# Patient Record
Sex: MALE | Race: WHITE
[De-identification: names, ages, dates, MRNs, and addresses within clinical notes are randomized per-mention and may not be internally consistent; named-entity substitution may affect disease eponyms.]

---

## 2021-04-02 ENCOUNTER — HOSPITAL ENCOUNTER (OUTPATIENT)
Dept: HOSPITAL 79 - RAD | Age: 31
End: 2021-04-02
Attending: INTERNAL MEDICINE
Payer: COMMERCIAL

## 2021-04-02 DIAGNOSIS — M54.5: Primary | ICD-10-CM

## 2021-06-05 ENCOUNTER — HOSPITAL ENCOUNTER (EMERGENCY)
Dept: HOSPITAL 79 - ER1 | Age: 31
Discharge: LEFT BEFORE DISCHARGE | End: 2021-06-05
Payer: COMMERCIAL

## 2021-06-05 DIAGNOSIS — Z53.21: Primary | ICD-10-CM

## 2021-07-23 ENCOUNTER — DOCUMENTATION (OUTPATIENT)
Dept: BARIATRICS/WEIGHT MGMT | Facility: CLINIC | Age: 31
End: 2021-07-23

## 2021-07-23 ENCOUNTER — OFFICE VISIT (OUTPATIENT)
Dept: BARIATRICS/WEIGHT MGMT | Facility: CLINIC | Age: 31
End: 2021-07-23

## 2021-07-23 VITALS
HEART RATE: 74 BPM | SYSTOLIC BLOOD PRESSURE: 136 MMHG | BODY MASS INDEX: 40.43 KG/M2 | DIASTOLIC BLOOD PRESSURE: 76 MMHG | WEIGHT: 298.5 LBS | OXYGEN SATURATION: 98 % | HEIGHT: 72 IN | RESPIRATION RATE: 18 BRPM | TEMPERATURE: 96.9 F

## 2021-07-23 DIAGNOSIS — I10 HYPERTENSION, UNSPECIFIED TYPE: ICD-10-CM

## 2021-07-23 DIAGNOSIS — K21.9 GASTROESOPHAGEAL REFLUX DISEASE, UNSPECIFIED WHETHER ESOPHAGITIS PRESENT: ICD-10-CM

## 2021-07-23 DIAGNOSIS — E78.1 HYPERTRIGLYCERIDEMIA: ICD-10-CM

## 2021-07-23 DIAGNOSIS — F41.9 ANXIETY: ICD-10-CM

## 2021-07-23 DIAGNOSIS — Z72.0 CURRENT EVERY DAY NICOTINE VAPING: ICD-10-CM

## 2021-07-23 DIAGNOSIS — R10.13 DYSPEPSIA: ICD-10-CM

## 2021-07-23 DIAGNOSIS — E78.5 HYPERLIPIDEMIA, UNSPECIFIED HYPERLIPIDEMIA TYPE: ICD-10-CM

## 2021-07-23 DIAGNOSIS — R06.00 DYSPNEA, UNSPECIFIED TYPE: ICD-10-CM

## 2021-07-23 DIAGNOSIS — K85.90 ACUTE PANCREATITIS, UNSPECIFIED COMPLICATION STATUS, UNSPECIFIED PANCREATITIS TYPE: ICD-10-CM

## 2021-07-23 DIAGNOSIS — R01.1 MURMUR: Primary | ICD-10-CM

## 2021-07-23 DIAGNOSIS — M10.9 GOUT, UNSPECIFIED CAUSE, UNSPECIFIED CHRONICITY, UNSPECIFIED SITE: ICD-10-CM

## 2021-07-23 DIAGNOSIS — E11.69 TYPE 2 DIABETES MELLITUS WITH OTHER SPECIFIED COMPLICATION, WITHOUT LONG-TERM CURRENT USE OF INSULIN (HCC): ICD-10-CM

## 2021-07-23 PROCEDURE — 99204 OFFICE O/P NEW MOD 45 MIN: CPT | Performed by: PHYSICIAN ASSISTANT

## 2021-07-23 RX ORDER — ERGOCALCIFEROL 1.25 MG/1
CAPSULE ORAL
COMMUNITY
Start: 2021-05-06

## 2021-07-23 RX ORDER — GLIMEPIRIDE 4 MG/1
4 TABLET ORAL DAILY
COMMUNITY
Start: 2021-05-06

## 2021-07-23 RX ORDER — ATORVASTATIN CALCIUM 40 MG/1
40 TABLET, FILM COATED ORAL
COMMUNITY
Start: 2021-05-06

## 2021-07-23 RX ORDER — LISINOPRIL AND HYDROCHLOROTHIAZIDE 25; 20 MG/1; MG/1
1 TABLET ORAL DAILY
COMMUNITY
Start: 2021-05-06

## 2021-07-23 RX ORDER — EZETIMIBE 10 MG/1
10 TABLET ORAL DAILY
COMMUNITY
Start: 2021-05-06

## 2021-07-23 RX ORDER — OMEPRAZOLE 40 MG/1
40 CAPSULE, DELAYED RELEASE ORAL DAILY
COMMUNITY
Start: 2021-05-06

## 2021-07-23 NOTE — PROGRESS NOTES
"Metabolic and Bariatric Surgery  Presurgical Nutrition Assessment     [facility]  07/23/2021  [id number]  [id number]  1990  male    Surgery desired: Sleeve Gastrectomy     Ht 182.9 cm (72\")   Wt 135 kg (298 lb 8 oz)      Current Outpatient Medications:   •  atorvastatin (LIPITOR) 40 MG tablet, Take 40 mg by mouth every night at bedtime., Disp: , Rfl:   •  ezetimibe (ZETIA) 10 MG tablet, Take 10 mg by mouth Daily., Disp: , Rfl:   •  glimepiride (AMARYL) 4 MG tablet, Take 4 mg by mouth Daily., Disp: , Rfl:   •  lisinopril-hydrochlorothiazide (PRINZIDE,ZESTORETIC) 20-25 MG per tablet, Take 1 tablet by mouth Daily. for blood pressure., Disp: , Rfl:   •  metFORMIN (GLUCOPHAGE) 1000 MG tablet, Take 1,000 mg by mouth 2 (two) times a day., Disp: , Rfl:   •  omeprazole (priLOSEC) 40 MG capsule, Take 40 mg by mouth Daily., Disp: , Rfl:   •  vitamin D (ERGOCALCIFEROL) 1.25 MG (94855 UT) capsule capsule, TAKE ONE CAPSULE BY MOUTH ONCE EVERY WEEK FOR VITAMIN D DEFICIENCY, Disp: , Rfl:       Nutrition Assessment    Estimated energy needs: 2800    Estimated calories for weight loss: 1800    IBW (Pounds): 198     Excess body weight (Pounds): 100       Nutrition Recall  24 Hour recall: (B) (L) (D) -  Reviewed and discussed with patient  Lunch:  [place] with lunch meat and cucumber  Dinner:  Bala sandwich, tater tots  Late meal:  Steak, vegetables, garlic bread, fried okra, baked beans     Exercise  active at work      Education    Provided manual for Sleeve Gastrectomy and reviewed necessary vitamin and protein supplementation for surgery.     Provided follow-up options for support, including contact information for dietitians here, if desired.  Web-based support information and apps for smart phones and computers given.  Noted that support group is offered at this clinic, and that support is associated with weight loss.    Recommend that team follow per protocol.      Nutrition Goals   Dietary Guidelines per " manual  Protein goal:  grams per day     Exercise Goals  Continue current exercise routine   Add 15-30 minutes of activity per day as tolerated        Angie Benitez RD  07/23/2021  10:59 EDT

## 2021-07-23 NOTE — PROGRESS NOTES
[facility] BARIATRIC SURGERY  [address]  [address]  formerly [facility] [id number] [phone]      Patient  Name:  Cezar Burden  :  1990        Chief Complaint:  weight gain; unable to maintain weight loss    History of Present Illness:  Cezar Burden is a 31 y.o. male who presents today for evaluation, education and consultation regarding bariatric and metabolic surgery. The patient is interested in sleeve gastrectomy with Dr. King.     Cezar has been overweight for all his life.Previous diet attempts include: High Protein, Low Carbohydrate and Fasting; None; None.  The most weight Cezar lost was 20 pounds on keto but was only able to maintain that weight loss for a few months.  His maximum lifetime weight is 353 pounds.    As above, patient has been overweight for many years, with numerous failed dietary/weight loss attempts.  He now has obesity related comorbidities and as such has decided to pursue weight loss surgery.    His wife had LSG with Dr. King recently, A1C went form 11.5 to 7 in 3 months, he has seen her success and ready for health improvement in himself.     H/o HTN, HLD/ hypertriglyceridemia, childhood murmur, DM dx . Gout- last flare approx , no issues since. Anxiety.  vaper- ready to quit.    GI: Reflux- daily omeprazole controls symptoms well, no past EGD. Episode of pancreatitis hospitalized, thought to be r/t hypertriglyceridemia, no procedures needed. GB present, no other GI complaints.     All other past medical, surgical, social and family history have been obtained and discussed as pertinent to bariatric surgery as below.     Past Medical History:   Diagnosis Date   • Acid reflux     daily omeprazole controls symptoms well, no past EGD.    • Anxiety    • Current every day nicotine vaping    • Diabetes (CMS/[facility])    • Fatigue    • Gout     last flare approx , no issues since   • HLD (hyperlipidemia)    • HTN (hypertension)    •  Hypertriglyceridemia    • Morbid obesity with BMI of 40.0-44.9, adult (CMS/[facility])    • Murmur     childhood   • Pancreatitis 2020    hypertriglyceridemia, no procedures.      History reviewed. No pertinent surgical history.    No Known Allergies    Current Outpatient Medications:   •  atorvastatin (LIPITOR) 40 MG tablet, Take 40 mg by mouth every night at bedtime., Disp: , Rfl:   •  ezetimibe (ZETIA) 10 MG tablet, Take 10 mg by mouth Daily., Disp: , Rfl:   •  glimepiride (AMARYL) 4 MG tablet, Take 4 mg by mouth Daily., Disp: , Rfl:   •  lisinopril-hydrochlorothiazide (PRINZIDE,ZESTORETIC) 20-25 MG per tablet, Take 1 tablet by mouth Daily. for blood pressure., Disp: , Rfl:   •  metFORMIN (GLUCOPHAGE) 1000 MG tablet, Take 1,000 mg by mouth 2 (two) times a day., Disp: , Rfl:   •  omeprazole (priLOSEC) 40 MG capsule, Take 40 mg by mouth Daily., Disp: , Rfl:   •  vitamin D (ERGOCALCIFEROL) 1.25 MG (81115 UT) capsule capsule, TAKE ONE CAPSULE BY MOUTH ONCE EVERY WEEK FOR VITAMIN D DEFICIENCY, Disp: , Rfl:     Social History     Socioeconomic History   • Marital status:      Spouse name: Not on file   • Number of children: Not on file   • Years of education: Not on file   • Highest education level: Not on file   Tobacco Use   • Smoking status: Former Smoker   • Smokeless tobacco: Never Used   Vaping Use   • Vaping Use: Every day   Substance and Sexual Activity   • Alcohol use: Yes     Comment: occasionally   • Drug use: Never     Family History   Problem Relation Age of Onset   • Obesity Mother    • Diabetes Mother    • Hypertension Mother    • Sleep apnea Mother    • Obesity Father    • Hypertension Father    • Diabetes Maternal Grandmother    • Hypertension Maternal Grandmother    • Cancer Maternal Grandmother         skin       Review of Systems:  Constitutional:  Reports fatigue, weight gain and denies fevers, chills.  HEENT:  denies headache, ear pain or loss of hearing, blurred or double vision, nasal discharge  or sore throat.  Cardiovascular:  Reports HTN, HLD, heart murmur and denies CAD, Atrial Fib, hx heart disease, heart murmur, hx MI, chest pain, palpitations, edema, hx DVT.  Respiratory:  Reports none and denies dyspnea on exertion, shortness of breath , cough , wheezing, sleep apnea, asthma, hx PE.  Gastrointestinal:  Reports heartburn and denies dysphagia, nausea, vomiting, abdominal pain, IBS, diarrhea, constipation, melena, blood in stool, gallbladder issues, liver disease, hx pancreatitis.  Genitourinary:  Reports none and denies history of  frequent UTI, incontinence, hematuria, dysuria, polyuria, polydipsia, renal insufficiency, renal failure.    Musculoskeletal:  Reports none and denies joint pain, fibromyalgia, arthritis and autoimmune disease.  Neurological:  Reports numbness /tingling and denies headaches, migraines, dizziness, confusion, seizure, stroke.  Psychiatric:  Reports hx anxiety and denies depressed mood, hx depression, bipolar disorder, suicidal ideation, hx suicide attempt, hx self injury, hx substance abuse, eating disorder.  Endocrine:  Reports diabetes, gout and denies thyroid disease.  Hematologic:  Reports none and denies bruising, bleeding disorder, hx anemia, hx blood transfusion.  Skin:  Reports none and denies rashes, hx MRSA.      Physical Exam:  Vital Signs:  Weight: 135 kg (298 lb 8 oz)   Body mass index is 40.48 kg/m².  Temp: 96.9 °F (36.1 °C)   Heart Rate: 74   BP: 136/76     Physical Exam  Vitals reviewed.   Constitutional:       Appearance: He is well-developed. He is obese.   HENT:      Head: Normocephalic.      Comments: mask  Neck:      Thyroid: No thyromegaly.   Cardiovascular:      Rate and Rhythm: Normal rate and regular rhythm.      Heart sounds: Normal heart sounds.   Pulmonary:      Effort: Pulmonary effort is normal. No respiratory distress.      Breath sounds: Normal breath sounds. No wheezing.   Abdominal:      General: Bowel sounds are normal. There is no  distension.      Palpations: Abdomen is soft.      Tenderness: There is no abdominal tenderness.   Musculoskeletal:         General: Normal range of motion.      Cervical back: Normal range of motion and neck supple.   Skin:     General: Skin is warm and dry.      Comments: Scattered brawny hyperpigmentation of LE   Neurological:      Mental Status: He is alert and oriented to person, place, and time.   Psychiatric:         Mood and Affect: Mood normal.         Behavior: Behavior normal.         Thought Content: Thought content normal.         Judgment: Judgment normal.         Patient Active Problem List   Diagnosis   • Murmur   • HTN (hypertension)   • HLD (hyperlipidemia)   • Diabetes (CMS/HCC)   • Acid reflux   • Hypertriglyceridemia   • Anxiety   • Current every day nicotine vaping   • Morbid obesity with BMI of 40.0-44.9, adult (CMS/HCC)   • Pancreatitis   • Gout   • Fatigue       Assessment:    Cezar Burden is a 31 y.o. year old male with medically complicated obesity pursuing sleeve gastrectomy.    Weight loss surgery is deemed medically necessary given the following obesity related comorbidities including diabetes, hypertension, dyslipidemia and GERD with current Weight: 135 kg (298 lb 8 oz) and Body mass index is 40.48 kg/m²..    Plan:  The consultation plan and program requirements were reviewed with the patient.  The patient has been advised that a letter of medical support must be obtained from his primary care physician or referring provider. A psychological evaluation will be arranged.  A nutritional evaluation will be performed.  The patient was advised to start a high protein and low carbohydrate diet.  Necessary lifestyle modifications were discussed.  Instructions on how to access DAYDAY was given to the patient.  DAYDAY is an internet based educational video that explains the surgical procedure chosen and answers basic questions regarding that procedure.     Patient's Body mass index is 40.48 kg/m².  indicating that he is morbidly obese (BMI > 40 or > 35 with obesity - related health condition). Obesity-related health conditions include the following: hypertension, diabetes mellitus, dyslipidemias and GERD. Obesity is worsening. BMI is is above average; BMI management plan is completed. We discussed consulting a Bariatric surgeon..        Preoperative testing will include: CBC, CMP, Lipids, TSH, HgA1C, EKG, CXR, Pulmonary Function Testing, Nicotine Testing, Gastric Emptying Study and EGD, h pylori    Additional preop clearances required prior to surgery: Cardiology.      The patient has been educated on expected postoperative lifestyle changes, including commitment to high protein diet, vitamin regimen, and exercise program.  They are aware that support groups are encouraged for optimal weight loss results. Patient understands that bariatric surgery is not cosmetic surgery but rather a tool to help make a lifelong commitment to lifestyle changes including diet, exercise, behavior modifications, and healthy habits. The procedure was discussed with the patient and all questions were answered. The importance of avoiding ASA/ NSAIDS/ steroids/ tobacco/ hormones/ immunomodulators perioperatively was discussed.         Asmita Mejias PA-C

## 2021-07-26 LAB
ALBUMIN SERPL-MCNC: 4.4 G/DL (ref 4–5)
ALBUMIN/GLOB SERPL: 1.6 {RATIO} (ref 1.2–2.2)
ALP SERPL-CCNC: 66 IU/L (ref 48–121)
ALT SERPL-CCNC: 29 IU/L (ref 0–44)
AST SERPL-CCNC: 14 IU/L (ref 0–40)
BASOPHILS # BLD AUTO: 0.1 X10E3/UL (ref 0–0.2)
BASOPHILS NFR BLD AUTO: 2 %
BILIRUB SERPL-MCNC: 0.8 MG/DL (ref 0–1.2)
BUN SERPL-MCNC: 11 MG/DL (ref 6–20)
BUN/CREAT SERPL: 13 (ref 9–20)
CALCIUM SERPL-MCNC: 9.9 MG/DL (ref 8.7–10.2)
CHLORIDE SERPL-SCNC: 98 MMOL/L (ref 96–106)
CHOLEST SERPL-MCNC: 143 MG/DL (ref 100–199)
CO2 SERPL-SCNC: 26 MMOL/L (ref 20–29)
CREAT SERPL-MCNC: 0.87 MG/DL (ref 0.76–1.27)
EOSINOPHIL # BLD AUTO: 0.1 X10E3/UL (ref 0–0.4)
EOSINOPHIL NFR BLD AUTO: 2 %
ERYTHROCYTE [DISTWIDTH] IN BLOOD BY AUTOMATED COUNT: 12.9 % (ref 11.6–15.4)
GLOBULIN SER CALC-MCNC: 2.8 G/DL (ref 1.5–4.5)
GLUCOSE SERPL-MCNC: 305 MG/DL (ref 65–99)
H PYLORI IGA SER-ACNC: <9 UNITS (ref 0–8.9)
H PYLORI IGG SER IA-ACNC: 0.16 INDEX VALUE (ref 0–0.79)
H PYLORI IGM SER-ACNC: <9 UNITS (ref 0–8.9)
HBA1C MFR BLD: 10.2 % (ref 4.8–5.6)
HCT VFR BLD AUTO: 48.4 % (ref 37.5–51)
HDLC SERPL-MCNC: 36 MG/DL
HGB BLD-MCNC: 16.8 G/DL (ref 13–17.7)
IMM GRANULOCYTES # BLD AUTO: 0 X10E3/UL (ref 0–0.1)
IMM GRANULOCYTES NFR BLD AUTO: 0 %
LDLC SERPL CALC-MCNC: 46 MG/DL (ref 0–99)
LYMPHOCYTES # BLD AUTO: 2.7 X10E3/UL (ref 0.7–3.1)
LYMPHOCYTES NFR BLD AUTO: 44 %
MCH RBC QN AUTO: 29 PG (ref 26.6–33)
MCHC RBC AUTO-ENTMCNC: 34.7 G/DL (ref 31.5–35.7)
MCV RBC AUTO: 83 FL (ref 79–97)
MONOCYTES # BLD AUTO: 0.4 X10E3/UL (ref 0.1–0.9)
MONOCYTES NFR BLD AUTO: 6 %
NEUTROPHILS # BLD AUTO: 2.8 X10E3/UL (ref 1.4–7)
NEUTROPHILS NFR BLD AUTO: 46 %
PLATELET # BLD AUTO: 262 X10E3/UL (ref 150–450)
POTASSIUM SERPL-SCNC: 5.2 MMOL/L (ref 3.5–5.2)
PROT SERPL-MCNC: 7.2 G/DL (ref 6–8.5)
RBC # BLD AUTO: 5.8 X10E6/UL (ref 4.14–5.8)
SODIUM SERPL-SCNC: 137 MMOL/L (ref 134–144)
TRIGL SERPL-MCNC: 413 MG/DL (ref 0–149)
TSH SERPL DL<=0.005 MIU/L-ACNC: 1.84 UIU/ML (ref 0.45–4.5)
VLDLC SERPL CALC-MCNC: 61 MG/DL (ref 5–40)
WBC # BLD AUTO: 6.1 X10E3/UL (ref 3.4–10.8)

## 2021-07-30 ENCOUNTER — APPOINTMENT (OUTPATIENT)
Dept: PREADMISSION TESTING | Facility: HOSPITAL | Age: 31
End: 2021-07-30

## 2021-07-30 PROCEDURE — U0004 COV-19 TEST NON-CDC HGH THRU: HCPCS

## 2021-07-30 PROCEDURE — C9803 HOPD COVID-19 SPEC COLLECT: HCPCS

## 2021-07-31 LAB — SARS-COV-2 RNA PNL SPEC NAA+PROBE: NOT DETECTED

## 2021-08-02 ENCOUNTER — LAB REQUISITION (OUTPATIENT)
Dept: LAB | Facility: HOSPITAL | Age: 31
End: 2021-08-02

## 2021-08-02 ENCOUNTER — OUTSIDE FACILITY SERVICE (OUTPATIENT)
Dept: BARIATRICS/WEIGHT MGMT | Facility: CLINIC | Age: 31
End: 2021-08-02

## 2021-08-02 DIAGNOSIS — K25.9 GASTRIC ULCER, UNSPECIFIED AS ACUTE OR CHRONIC, WITHOUT HEMORRHAGE OR PERFORATION: ICD-10-CM

## 2021-08-02 PROCEDURE — 88305 TISSUE EXAM BY PATHOLOGIST: CPT | Performed by: SURGERY

## 2021-08-02 PROCEDURE — 43239 EGD BIOPSY SINGLE/MULTIPLE: CPT | Performed by: SURGERY

## 2021-08-03 DIAGNOSIS — K21.9 GASTROESOPHAGEAL REFLUX DISEASE, UNSPECIFIED WHETHER ESOPHAGITIS PRESENT: ICD-10-CM

## 2021-08-03 LAB
CYTO UR: NORMAL
LAB AP CASE REPORT: NORMAL
LAB AP CLINICAL INFORMATION: NORMAL
PATH REPORT.FINAL DX SPEC: NORMAL
PATH REPORT.GROSS SPEC: NORMAL

## 2021-08-10 ENCOUNTER — OFFICE VISIT (OUTPATIENT)
Dept: CARDIOLOGY | Facility: CLINIC | Age: 31
End: 2021-08-10

## 2021-08-10 VITALS
SYSTOLIC BLOOD PRESSURE: 140 MMHG | HEART RATE: 77 BPM | TEMPERATURE: 97.3 F | HEIGHT: 72 IN | BODY MASS INDEX: 40.28 KG/M2 | DIASTOLIC BLOOD PRESSURE: 86 MMHG | OXYGEN SATURATION: 94 % | WEIGHT: 297.4 LBS

## 2021-08-10 DIAGNOSIS — I10 HYPERTENSION, UNSPECIFIED TYPE: ICD-10-CM

## 2021-08-10 DIAGNOSIS — E66.01 MORBID OBESITY WITH BMI OF 40.0-44.9, ADULT (HCC): ICD-10-CM

## 2021-08-10 DIAGNOSIS — E11.69 TYPE 2 DIABETES MELLITUS WITH OTHER SPECIFIED COMPLICATION, WITHOUT LONG-TERM CURRENT USE OF INSULIN (HCC): ICD-10-CM

## 2021-08-10 DIAGNOSIS — Z01.810 PREOPERATIVE CARDIOVASCULAR EXAMINATION: Primary | ICD-10-CM

## 2021-08-10 DIAGNOSIS — Z86.79 HISTORY OF CARDIAC MURMUR: ICD-10-CM

## 2021-08-10 DIAGNOSIS — R06.09 DYSPNEA ON EXERTION: ICD-10-CM

## 2021-08-10 DIAGNOSIS — E78.1 HYPERTRIGLYCERIDEMIA: ICD-10-CM

## 2021-08-10 PROCEDURE — 99204 OFFICE O/P NEW MOD 45 MIN: CPT | Performed by: NURSE PRACTITIONER

## 2021-08-10 PROCEDURE — 93000 ELECTROCARDIOGRAM COMPLETE: CPT | Performed by: NURSE PRACTITIONER

## 2021-08-10 NOTE — PROGRESS NOTES
Subjective   Cezar Burden is a 31 y.o. male.   Chief Complaint   Patient presents with   • Establish Care   • Surgical Clearance     BIATRIAC SURGERY      History of Present Illness   Buster burden is a 31-year-old  male who presents to the clinic today as a new patient for cardiac evaluation.    He is currently undergoing work-up for gastric surgery.   Hypertension currently on lisinopril/HCTZ daily.  Reports home monitoring with normal readings.  Denies chest pain.  Does try to limit sodium however reports it is very difficult.  Hyper triglyceridemia currently being treated by his PCP.  He reports hospitalized about a year ago for pancreatitis as a result of elevated triglycerides.  He reports at that time his triglycerides were over 3000.  He currently is taking Lipitor daily and Zetia 10 mg daily without myalgias.  He is trying to eat healthier and exercise.  He feels his labs have improved significantly.  He reports a history of childhood murmur but no sequela has adult.  He does not recall any recent cardiac work-up or echocardiogram.  He is a diabetic and currently being treated by his PCP.  He does currently use an E-Cig and is working on cutting back/quitting.  Denies family history of cardiovascular disease.  Denies palpitations, swelling in lower extremities, dizziness, syncope or lightheadedness.    The following portions of the patient's history were reviewed and updated as appropriate: allergies, current medications, past family history, past medical history, past social history, past surgical history and problem list.    Current Outpatient Medications:   •  atorvastatin (LIPITOR) 40 MG tablet, Take 40 mg by mouth every night at bedtime., Disp: , Rfl:   •  ezetimibe (ZETIA) 10 MG tablet, Take 10 mg by mouth Daily., Disp: , Rfl:   •  glimepiride (AMARYL) 4 MG tablet, Take 4 mg by mouth Daily., Disp: , Rfl:   •  lisinopril-hydrochlorothiazide (PRINZIDE,ZESTORETIC) 20-25 MG per tablet, Take 1  "tablet by mouth Daily. for blood pressure., Disp: , Rfl:   •  metFORMIN (GLUCOPHAGE) 1000 MG tablet, Take 1,000 mg by mouth 2 (two) times a day., Disp: , Rfl:   •  omeprazole (priLOSEC) 40 MG capsule, Take 40 mg by mouth Daily., Disp: , Rfl:   •  vitamin D (ERGOCALCIFEROL) 1.25 MG (35189 UT) capsule capsule, TAKE ONE CAPSULE BY MOUTH ONCE EVERY WEEK FOR VITAMIN D DEFICIENCY, Disp: , Rfl:     Review of Systems   Constitutional: Negative for activity change, appetite change, chills, fatigue and fever.   HENT: Negative for congestion, drooling, ear discharge, ear pain, mouth sores, nosebleeds, postnasal drip, rhinorrhea, sneezing and sore throat.    Eyes: Negative for pain, discharge and visual disturbance.   Respiratory: Negative for cough, shortness of breath and wheezing.    Cardiovascular: Negative for chest pain, palpitations and leg swelling.   Gastrointestinal: Negative for abdominal pain, constipation, diarrhea, nausea and vomiting.   Skin: Negative for rash and wound.   Neurological: Negative for dizziness, syncope, weakness, numbness and headaches.   All other systems reviewed and are negative.    /86   Pulse 77   Temp 97.3 °F (36.3 °C)   Ht 182.9 cm (72\")   Wt 135 kg (297 lb 6.4 oz)   SpO2 94%   BMI 40.33 kg/m²     Objective   No Known Allergies    Physical Exam  Vitals reviewed.   Constitutional:       Appearance: Normal appearance. He is well-developed.   HENT:      Head: Normocephalic.   Eyes:      Conjunctiva/sclera: Conjunctivae normal.      Pupils: Pupils are equal, round, and reactive to light.   Neck:      Thyroid: No thyromegaly.      Vascular: No carotid bruit or JVD.   Cardiovascular:      Rate and Rhythm: Normal rate and regular rhythm.   Pulmonary:      Effort: Pulmonary effort is normal.      Breath sounds: Normal breath sounds.   Abdominal:      General: Bowel sounds are normal.      Palpations: Abdomen is soft. There is no hepatomegaly, splenomegaly or mass.      Tenderness: There " is no abdominal tenderness.   Musculoskeletal:         General: Normal range of motion.      Cervical back: Normal range of motion and neck supple.      Right lower leg: No edema.      Left lower leg: No edema.   Skin:     General: Skin is warm and dry.      Comments: Scarring noted on upper and lower extremities from wounds    Neurological:      Mental Status: He is alert and oriented to person, place, and time.   Psychiatric:         Attention and Perception: Attention normal.         Mood and Affect: Mood normal.         Speech: Speech normal.         Behavior: Behavior normal. Behavior is cooperative.         Cognition and Memory: Cognition normal.         ECG 12 Lead    Date/Time: 8/10/2021 3:06 PM  Performed by: Irma Marcelino APRN  Authorized by: Irma Marcelino APRN   Comparison: not compared with previous ECG   Rhythm: sinus rhythm  Rate: normal  BPM: 74    Clinical impression: non-specific ECG            Assessment/Plan   Diagnoses and all orders for this visit:    1. Preoperative cardiovascular examination (Primary)  -     Treadmill Stress Test; Future  EKG, reviewed and discussed    2. Dyspnea on exertion  -     Adult Transthoracic Echo Complete W/ Cont if Necessary Per Protocol; Future  -     ECG 12 Lead    3. Hypertension, unspecified type  Mildly elevated today, will continue to monitor BP and heart rate, encouraging compliance with medication, counseling on low-sodium dietary intake.  If persistently greater than 140/90 will need medication titration.    4. Hypertriglyceridemia  On Lipitor and Zetia followed by PCP.  Review of laboratory testing from 6/20/2021.  Healthy lifestyle encouraged and low cholesterol dietary intake    5. Type 2 diabetes mellitus with other specified complication, without long-term current use of insulin (CMS/HCC)  Followed and managed by PCP    6. Morbid obesity with BMI of 40.0-44.9, adult (CMS/HCC)  Loss discussed healthy lifestyle encouraged    7. History of  cardiac murmur  -     Adult Transthoracic Echo Complete W/ Cont if Necessary Per Protocol; Future    Proceed with cardiovascular work-up, results pending.  Encourage in monitoring of BP and reviewed and discussed the importance of persistent readings less than 140/90 before surgical clearance.  He expresses understanding. Follow-up in 4 weeks, sooner if needed.

## 2021-08-18 ENCOUNTER — APPOINTMENT (OUTPATIENT)
Dept: RESPIRATORY THERAPY | Facility: HOSPITAL | Age: 31
End: 2021-08-18

## 2021-08-18 ENCOUNTER — HOSPITAL ENCOUNTER (OUTPATIENT)
Dept: NUCLEAR MEDICINE | Facility: HOSPITAL | Age: 31
Discharge: HOME | End: 2021-08-18

## 2021-08-18 DIAGNOSIS — R10.13 DYSPEPSIA: ICD-10-CM

## 2021-08-18 DIAGNOSIS — E11.69 TYPE 2 DIABETES MELLITUS WITH OTHER SPECIFIED COMPLICATION, WITHOUT LONG-TERM CURRENT USE OF INSULIN (HCC): ICD-10-CM

## 2021-08-18 DIAGNOSIS — K21.9 GASTROESOPHAGEAL REFLUX DISEASE, UNSPECIFIED WHETHER ESOPHAGITIS PRESENT: ICD-10-CM

## 2021-08-18 PROCEDURE — 78264 GASTRIC EMPTYING IMG STUDY: CPT

## 2021-08-18 PROCEDURE — 78264 GASTRIC EMPTYING IMG STUDY: CPT | Performed by: RADIOLOGY

## 2021-08-18 PROCEDURE — 0 TECHNETIUM SULFUR COLLOID: Performed by: PHYSICIAN ASSISTANT

## 2021-08-18 PROCEDURE — A9541 TC99M SULFUR COLLOID: HCPCS | Performed by: PHYSICIAN ASSISTANT

## 2021-08-18 RX ADMIN — TECHNETIUM TC 99M SULFUR COLLOID 1 DOSE: KIT at 13:30

## 2021-08-19 ENCOUNTER — TRANSCRIBE ORDERS (OUTPATIENT)
Dept: ADMINISTRATIVE | Facility: HOSPITAL | Age: 31
End: 2021-08-19

## 2021-08-19 DIAGNOSIS — Z01.818 OTHER SPECIFIED PRE-OPERATIVE EXAMINATION: Primary | ICD-10-CM

## 2021-08-20 ENCOUNTER — LAB (OUTPATIENT)
Dept: LAB | Facility: HOSPITAL | Age: 31
End: 2021-08-20

## 2021-08-20 ENCOUNTER — APPOINTMENT (OUTPATIENT)
Dept: RESPIRATORY THERAPY | Facility: HOSPITAL | Age: 31
End: 2021-08-20

## 2021-08-20 DIAGNOSIS — Z01.818 OTHER SPECIFIED PRE-OPERATIVE EXAMINATION: ICD-10-CM

## 2021-08-25 ENCOUNTER — TELEMEDICINE (OUTPATIENT)
Dept: PSYCHIATRY | Facility: CLINIC | Age: 31
End: 2021-08-25

## 2021-08-25 DIAGNOSIS — Z71.89 PRE-BARIATRIC SURGERY PSYCHOLOGICAL EVALUATION: Primary | ICD-10-CM

## 2021-08-25 PROCEDURE — 90792 PSYCH DIAG EVAL W/MED SRVCS: CPT | Performed by: NURSE PRACTITIONER
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
## 2021-08-27 ENCOUNTER — HOSPITAL ENCOUNTER (OUTPATIENT)
Dept: CARDIOLOGY | Facility: HOSPITAL | Age: 31
Discharge: HOME | End: 2021-08-27

## 2021-08-27 DIAGNOSIS — R06.09 DYSPNEA ON EXERTION: ICD-10-CM

## 2021-08-27 DIAGNOSIS — Z86.79 HISTORY OF CARDIAC MURMUR: ICD-10-CM

## 2021-08-27 DIAGNOSIS — Z01.810 PREOPERATIVE CARDIOVASCULAR EXAMINATION: ICD-10-CM

## 2021-08-27 PROCEDURE — 93306 TTE W/DOPPLER COMPLETE: CPT

## 2021-08-27 PROCEDURE — 93306 TTE W/DOPPLER COMPLETE: CPT | Performed by: INTERNAL MEDICINE

## 2021-08-27 PROCEDURE — 93017 CV STRESS TEST TRACING ONLY: CPT

## 2021-08-27 PROCEDURE — 93018 CV STRESS TEST I&R ONLY: CPT | Performed by: INTERNAL MEDICINE

## 2021-08-30 DIAGNOSIS — R93.1 ABNORMAL ECHOCARDIOGRAM: Primary | ICD-10-CM

## 2021-08-30 LAB
BH CV ECHO MEAS - ACS: 2.4 CM
BH CV ECHO MEAS - AO ROOT AREA (BSA CORRECTED): 1.2
BH CV ECHO MEAS - AO ROOT AREA: 7.8 CM^2
BH CV ECHO MEAS - AO ROOT DIAM: 3.2 CM
BH CV ECHO MEAS - BSA(HAYCOCK): 2.7 M^2
BH CV ECHO MEAS - BSA: 2.5 M^2
BH CV ECHO MEAS - BZI_BMI: 40.3 KILOGRAMS/M^2
BH CV ECHO MEAS - BZI_METRIC_HEIGHT: 182.9 CM
BH CV ECHO MEAS - BZI_METRIC_WEIGHT: 134.7 KG
BH CV ECHO MEAS - EDV(CUBED): 74.1 ML
BH CV ECHO MEAS - EDV(MOD-SP4): 82.5 ML
BH CV ECHO MEAS - EDV(TEICH): 78.6 ML
BH CV ECHO MEAS - EF(CUBED): 46.9 %
BH CV ECHO MEAS - EF(MOD-SP4): 37.7 %
BH CV ECHO MEAS - EF(TEICH): 39.6 %
BH CV ECHO MEAS - ESV(CUBED): 39.3 ML
BH CV ECHO MEAS - ESV(MOD-SP4): 51.4 ML
BH CV ECHO MEAS - ESV(TEICH): 47.4 ML
BH CV ECHO MEAS - FS: 19 %
BH CV ECHO MEAS - IVS/LVPW: 1.1
BH CV ECHO MEAS - IVSD: 1.6 CM
BH CV ECHO MEAS - LA DIMENSION: 3.6 CM
BH CV ECHO MEAS - LA/AO: 1.1
BH CV ECHO MEAS - LV DIASTOLIC VOL/BSA (35-75): 32.7 ML/M^2
BH CV ECHO MEAS - LV MASS(C)D: 249.5 GRAMS
BH CV ECHO MEAS - LV MASS(C)DI: 99 GRAMS/M^2
BH CV ECHO MEAS - LV SYSTOLIC VOL/BSA (12-30): 20.4 ML/M^2
BH CV ECHO MEAS - LVIDD: 4.2 CM
BH CV ECHO MEAS - LVIDS: 3.4 CM
BH CV ECHO MEAS - LVLD AP4: 8.5 CM
BH CV ECHO MEAS - LVLS AP4: 8.6 CM
BH CV ECHO MEAS - LVOT AREA (M): 3.1 CM^2
BH CV ECHO MEAS - LVOT AREA: 3.1 CM^2
BH CV ECHO MEAS - LVOT DIAM: 2 CM
BH CV ECHO MEAS - LVPWD: 1.4 CM
BH CV ECHO MEAS - MV A MAX VEL: 66.8 CM/SEC
BH CV ECHO MEAS - MV E MAX VEL: 99 CM/SEC
BH CV ECHO MEAS - MV E/A: 1.5
BH CV ECHO MEAS - PA ACC TIME: 0.12 SEC
BH CV ECHO MEAS - PA PR(ACCEL): 25 MMHG
BH CV ECHO MEAS - SI(CUBED): 13.8 ML/M^2
BH CV ECHO MEAS - SI(MOD-SP4): 12.3 ML/M^2
BH CV ECHO MEAS - SI(TEICH): 12.4 ML/M^2
BH CV ECHO MEAS - SV(CUBED): 34.8 ML
BH CV ECHO MEAS - SV(MOD-SP4): 31.1 ML
BH CV ECHO MEAS - SV(TEICH): 31.1 ML
BH CV STRESS BP STAGE 1: NORMAL
BH CV STRESS BP STAGE 2: NORMAL
BH CV STRESS DURATION MIN STAGE 1: 3
BH CV STRESS DURATION MIN STAGE 2: 3
BH CV STRESS DURATION MIN STAGE 3: 2
BH CV STRESS DURATION SEC STAGE 1: 0
BH CV STRESS DURATION SEC STAGE 2: 0
BH CV STRESS DURATION SEC STAGE 3: 51
BH CV STRESS GRADE STAGE 1: 10
BH CV STRESS GRADE STAGE 2: 12
BH CV STRESS GRADE STAGE 3: 14
BH CV STRESS HR STAGE 1: 112
BH CV STRESS HR STAGE 2: 128
BH CV STRESS HR STAGE 3: 163
BH CV STRESS METS STAGE 1: 5
BH CV STRESS METS STAGE 2: 7.5
BH CV STRESS METS STAGE 3: 10
BH CV STRESS PROTOCOL 1: NORMAL
BH CV STRESS RECOVERY BP: NORMAL MMHG
BH CV STRESS RECOVERY HR: 104 BPM
BH CV STRESS SPEED STAGE 1: 1.7
BH CV STRESS SPEED STAGE 2: 2.5
BH CV STRESS SPEED STAGE 3: 3.4
BH CV STRESS STAGE 1: 1
BH CV STRESS STAGE 2: 2
BH CV STRESS STAGE 3: 3
MAXIMAL PREDICTED HEART RATE: 189 BPM
MAXIMAL PREDICTED HEART RATE: 189 BPM
PERCENT MAX PREDICTED HR: 86.24 %
STRESS BASELINE BP: NORMAL MMHG
STRESS BASELINE HR: 85 BPM
STRESS PERCENT HR: 101 %
STRESS POST ESTIMATED WORKLOAD: 10.1 METS
STRESS POST EXERCISE DUR MIN: 8 MIN
STRESS POST EXERCISE DUR SEC: 51 SEC
STRESS POST PEAK BP: NORMAL MMHG
STRESS POST PEAK HR: 163 BPM
STRESS TARGET HR: 161 BPM
STRESS TARGET HR: 161 BPM

## 2021-09-01 ENCOUNTER — APPOINTMENT (OUTPATIENT)
Dept: RESPIRATORY THERAPY | Facility: HOSPITAL | Age: 31
End: 2021-09-01

## 2021-09-17 ENCOUNTER — APPOINTMENT (OUTPATIENT)
Dept: RESPIRATORY THERAPY | Facility: HOSPITAL | Age: 31
End: 2021-09-17

## 2021-09-20 ENCOUNTER — APPOINTMENT (OUTPATIENT)
Dept: NUCLEAR MEDICINE | Facility: HOSPITAL | Age: 31
End: 2021-09-20

## 2021-09-20 ENCOUNTER — APPOINTMENT (OUTPATIENT)
Dept: CARDIOLOGY | Facility: HOSPITAL | Age: 31
End: 2021-09-20

## 2021-09-22 ENCOUNTER — HOSPITAL ENCOUNTER (INPATIENT)
Dept: HOSPITAL 79 - ER1 | Age: 31
LOS: 2 days | Discharge: HOME | DRG: 177 | End: 2021-09-24
Attending: INTERNAL MEDICINE | Admitting: INTERNAL MEDICINE
Payer: COMMERCIAL

## 2021-09-22 VITALS — HEIGHT: 74 IN | BODY MASS INDEX: 38.5 KG/M2 | WEIGHT: 300 LBS

## 2021-09-22 DIAGNOSIS — I10: ICD-10-CM

## 2021-09-22 DIAGNOSIS — Z82.49: ICD-10-CM

## 2021-09-22 DIAGNOSIS — D69.6: ICD-10-CM

## 2021-09-22 DIAGNOSIS — J15.9: ICD-10-CM

## 2021-09-22 DIAGNOSIS — U07.1: Primary | ICD-10-CM

## 2021-09-22 DIAGNOSIS — J12.82: ICD-10-CM

## 2021-09-22 DIAGNOSIS — E78.5: ICD-10-CM

## 2021-09-22 DIAGNOSIS — J96.01: ICD-10-CM

## 2021-09-22 DIAGNOSIS — F17.210: ICD-10-CM

## 2021-09-22 DIAGNOSIS — E66.01: ICD-10-CM

## 2021-09-22 LAB
BUN/CREATININE RATIO: 15 (ref 0–10)
HGB BLD-MCNC: 16.5 GM/DL (ref 14–17.5)
RED BLOOD COUNT: 5.57 M/UL (ref 4.2–5.5)
WHITE BLOOD COUNT: 6.3 K/UL (ref 4.5–11)

## 2021-09-22 PROCEDURE — 3E0333Z INTRODUCTION OF ANTI-INFLAMMATORY INTO PERIPHERAL VEIN, PERCUTANEOUS APPROACH: ICD-10-PCS | Performed by: INTERNAL MEDICINE

## 2021-09-22 PROCEDURE — U0002 COVID-19 LAB TEST NON-CDC: HCPCS

## 2021-09-22 PROCEDURE — M0243 CASIRIVI AND IMDEVI INFUSION: HCPCS

## 2021-09-22 PROCEDURE — 8E0ZXY6 ISOLATION: ICD-10-PCS | Performed by: INTERNAL MEDICINE

## 2021-09-22 PROCEDURE — XW033G6 INTRODUCTION OF REGN-COV2 MONOCLONAL ANTIBODY INTO PERIPHERAL VEIN, PERCUTANEOUS APPROACH, NEW TECHNOLOGY GROUP 6: ICD-10-PCS | Performed by: INTERNAL MEDICINE

## 2021-09-22 PROCEDURE — XW033E5 INTRODUCTION OF REMDESIVIR ANTI-INFECTIVE INTO PERIPHERAL VEIN, PERCUTANEOUS APPROACH, NEW TECHNOLOGY GROUP 5: ICD-10-PCS | Performed by: INTERNAL MEDICINE

## 2021-09-23 LAB
BUN/CREATININE RATIO: 24 (ref 0–10)
HGB BLD-MCNC: 16.6 GM/DL (ref 14–17.5)
RED BLOOD COUNT: 5.54 M/UL (ref 4.2–5.5)
WHITE BLOOD COUNT: 6.4 K/UL (ref 4.5–11)

## 2021-09-24 LAB
BUN/CREATININE RATIO: 30 (ref 0–10)
HGB BLD-MCNC: 14.7 GM/DL (ref 14–17.5)
RED BLOOD COUNT: 4.97 M/UL (ref 4.2–5.5)
WHITE BLOOD COUNT: 6.8 K/UL (ref 4.5–11)

## 2022-04-09 ENCOUNTER — HOSPITAL ENCOUNTER (EMERGENCY)
Dept: HOSPITAL 79 - ER1 | Age: 32
Discharge: HOME | End: 2022-04-09
Payer: COMMERCIAL
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
## 2022-04-09 DIAGNOSIS — I10: ICD-10-CM

## 2022-04-09 DIAGNOSIS — Z79.84: ICD-10-CM

## 2022-04-09 DIAGNOSIS — E11.9: ICD-10-CM

## 2022-04-09 DIAGNOSIS — S29.012A: Primary | ICD-10-CM
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
## 2023-04-21 ENCOUNTER — OFFICE VISIT (OUTPATIENT)
Dept: CARDIOLOGY | Facility: CLINIC | Age: 33
End: 2023-04-21
Payer: MEDICAID

## 2023-04-21 VITALS
BODY MASS INDEX: 38.5 KG/M2 | OXYGEN SATURATION: 97 % | RESPIRATION RATE: 20 BRPM | DIASTOLIC BLOOD PRESSURE: 72 MMHG | HEART RATE: 81 BPM | WEIGHT: 300 LBS | SYSTOLIC BLOOD PRESSURE: 108 MMHG | HEIGHT: 74 IN

## 2023-04-21 DIAGNOSIS — E11.69 TYPE 2 DIABETES MELLITUS WITH OTHER SPECIFIED COMPLICATION, WITHOUT LONG-TERM CURRENT USE OF INSULIN: ICD-10-CM

## 2023-04-21 DIAGNOSIS — I51.9 LV DYSFUNCTION: ICD-10-CM

## 2023-04-21 DIAGNOSIS — E66.01 MORBID OBESITY WITH BMI OF 40.0-44.9, ADULT: ICD-10-CM

## 2023-04-21 DIAGNOSIS — Z91.89 MULTIPLE RISK FACTORS FOR CORONARY ARTERY DISEASE: ICD-10-CM

## 2023-04-21 DIAGNOSIS — Z91.89 RISK FACTORS FOR OBSTRUCTIVE SLEEP APNEA: ICD-10-CM

## 2023-04-21 DIAGNOSIS — E78.2 MIXED HYPERLIPIDEMIA: ICD-10-CM

## 2023-04-21 DIAGNOSIS — R93.1 ABNORMAL ECHOCARDIOGRAM: Primary | ICD-10-CM

## 2023-04-21 DIAGNOSIS — I10 PRIMARY HYPERTENSION: ICD-10-CM

## 2023-04-21 RX ORDER — FENOFIBRATE 145 MG/1
145 TABLET, COATED ORAL DAILY
Qty: 30 TABLET | Refills: 3 | Status: SHIPPED | OUTPATIENT
Start: 2023-04-21

## 2023-04-21 NOTE — PROGRESS NOTES
Subjective     Cezar Burden is a 33 y.o. male.   Chief Complaint   Patient presents with   • Heart Problem     New pt here states by pcp for abnormal ekg left ventricle abnormal      History of Present Illness   Cezar Burden is a 33-year-old male who presents to clinic today for cardiology follow-up.  He was seen a year and a half ago for preoperative surgical evaluation and was advised to follow-up however never followed up.  He returns today to follow-up.    He reports he had been off all medications until recently he has been recently initiated on medications.  He reports he has been taken medicines for about 2 weeks on a regular basis. He has underlying history of hypertension currently on lisinopril HCTZ 20/25, no home monitoring.  Hyperlipidemia currently on Lipitor 40 mg nightly.  His most recent labs from 4/13/2023 from PCP show and uncalculated LDL due to triglycerides at 530.  Patient reports that is much better for him than 3000.  He has had problems with pancreatitis as a result of elevated triglycerides previously.  He states he has recently made some lifestyle changes trying to improve his dietary intake.    Previous testing with mild LV dysfunction at 46 to 50%.  He had undergone a treadmill stress test and had nuclear stress test was recommended with follow-up to change medications and discuss results however he was unable to follow-up.  He complains of intermittent dyspnea denies swelling denies orthopnea occasional palpitation however not frequently no chest pain.  He does have fatigue and daytime somnolence, no history of sleep apnea and has not been previously tested.  He is a former smoker and does vape currently. History significant for DM, currently on oral medications, managed by PCP.  Most recent hemoglobin A1c was 9.9.    Patient Active Problem List   Diagnosis   • Murmur   • HTN (hypertension)   • HLD (hyperlipidemia)   • Diabetes   • Acid reflux   • Hypertriglyceridemia   • Anxiety   •  Current every day nicotine vaping   • Morbid obesity with BMI of 40.0-44.9, adult   • Pancreatitis   • Gout   • Fatigue     Past Medical History:   Diagnosis Date   • Acid reflux     daily omeprazole controls symptoms well, no past EGD.    • Anxiety    • Current every day nicotine vaping    • Diabetes 2014   • Fatigue    • Gout     last flare approx 2016, no issues since   • HLD (hyperlipidemia)    • HTN (hypertension)    • Hypertriglyceridemia    • Morbid obesity with BMI of 40.0-44.9, adult    • Murmur     childhood   • Pancreatitis 2020    hypertriglyceridemia, no procedures.      Past Surgical History:   Procedure Laterality Date   • COLONOSCOPY     • ENDOSCOPY       Family History   Problem Relation Age of Onset   • Obesity Mother    • Diabetes Mother    • Hypertension Mother    • Sleep apnea Mother    • Bipolar disorder Mother    • Obesity Father    • Hypertension Father    • Diabetes Maternal Grandmother    • Hypertension Maternal Grandmother    • Cancer Maternal Grandmother         skin     Social History     Tobacco Use   • Smoking status: Former   • Smokeless tobacco: Never   • Tobacco comments:     vapes   Vaping Use   • Vaping Use: Every day   • Substances: Nicotine, Flavoring   • Devices: Pre-filled or refillable cartridge, Refillable tank   Substance Use Topics   • Alcohol use: Not Currently   • Drug use: Never     The following portions of the patient's history were reviewed and updated as appropriate: allergies, current medications, past family history, past medical history, past social history, past surgical history and problem list.    No Known Allergies      Current Outpatient Medications:   •  atorvastatin (LIPITOR) 40 MG tablet, Take 1 tablet by mouth every night at bedtime., Disp: , Rfl:   •  glimepiride (AMARYL) 4 MG tablet, Take 1 tablet by mouth Daily., Disp: , Rfl:   •  lisinopril-hydrochlorothiazide (PRINZIDE,ZESTORETIC) 20-25 MG per tablet, Take 1 tablet by mouth Daily. for blood  "pressure., Disp: , Rfl:   •  metFORMIN (GLUCOPHAGE) 1000 MG tablet, Take 1 tablet by mouth 2 (two) times a day., Disp: , Rfl:   •  fenofibrate (TRICOR) 145 MG tablet, Take 1 tablet by mouth Daily., Disp: 30 tablet, Rfl: 3    Review of Systems   Constitutional: Positive for fatigue. Negative for activity change, appetite change, chills, diaphoresis and fever.   HENT: Negative for congestion, drooling, ear discharge, ear pain, mouth sores, nosebleeds, postnasal drip, rhinorrhea, sinus pressure, sneezing and sore throat.    Eyes: Negative for pain, discharge and visual disturbance.   Respiratory: Positive for shortness of breath. Negative for cough, chest tightness and wheezing.    Cardiovascular: Negative for chest pain, palpitations and leg swelling.   Gastrointestinal: Negative for abdominal pain, constipation, diarrhea, nausea and vomiting.   Endocrine: Negative for cold intolerance, heat intolerance, polydipsia, polyphagia and polyuria.   Musculoskeletal: Negative for arthralgias, myalgias and neck pain.   Skin: Negative for rash and wound.   Neurological: Negative for dizziness, syncope, speech difficulty, weakness, light-headedness and headaches.   Hematological: Negative for adenopathy. Does not bruise/bleed easily.   Psychiatric/Behavioral: Negative for confusion, dysphoric mood and sleep disturbance. The patient is not nervous/anxious.    All other systems reviewed and are negative.    /72 (BP Location: Left arm, Patient Position: Sitting, Cuff Size: Adult)   Pulse 81   Resp 20   Ht 188 cm (74\")   Wt 136 kg (300 lb)   SpO2 97%   BMI 38.52 kg/m²     Objective   No Known Allergies    Physical Exam  Vitals reviewed.   Constitutional:       Appearance: Normal appearance. He is well-developed. He is obese.   HENT:      Head: Normocephalic.   Eyes:      Conjunctiva/sclera: Conjunctivae normal.   Neck:      Vascular: No carotid bruit or JVD.   Cardiovascular:      Rate and Rhythm: Normal rate and regular " rhythm.   Pulmonary:      Effort: Pulmonary effort is normal.      Breath sounds: Normal breath sounds.   Musculoskeletal:      Cervical back: Neck supple.      Right lower leg: No edema.      Left lower leg: No edema.   Skin:     General: Skin is warm and dry.   Neurological:      Mental Status: He is alert and oriented to person, place, and time.   Psychiatric:         Attention and Perception: Attention normal.         Mood and Affect: Mood normal.         Speech: Speech normal.         Behavior: Behavior normal. Behavior is cooperative.         Cognition and Memory: Cognition normal.           ECG 12 Lead    Date/Time: 4/21/2023 10:29 AM  Performed by: Irma Marcelino APRN  Authorized by: Irma Marcelino APRN   Comparison: compared with previous ECG   Similar to previous ECG  Rhythm: sinus rhythm  Rate: normal  BPM: 81  Other findings: non-specific ST-T wave changes  Other findings comments: possible right ventricular hyptertrophy    Clinical impression: abnormal EKG  Comments: QT/QTc - 355/392                LABS  WBC   Date Value Ref Range Status   07/23/2021 6.1 3.4 - 10.8 x10E3/uL Final     RBC   Date Value Ref Range Status   07/23/2021 5.80 4.14 - 5.80 x10E6/uL Final     Hemoglobin   Date Value Ref Range Status   07/23/2021 16.8 13.0 - 17.7 g/dL Final     Hematocrit   Date Value Ref Range Status   07/23/2021 48.4 37.5 - 51.0 % Final     MCV   Date Value Ref Range Status   07/23/2021 83 79 - 97 fL Final     MCH   Date Value Ref Range Status   07/23/2021 29.0 26.6 - 33.0 pg Final     MCHC   Date Value Ref Range Status   07/23/2021 34.7 31.5 - 35.7 g/dL Final     RDW   Date Value Ref Range Status   07/23/2021 12.9 11.6 - 15.4 % Final     Platelets   Date Value Ref Range Status   07/23/2021 262 150 - 450 x10E3/uL Final     Neutrophil Rel %   Date Value Ref Range Status   07/23/2021 46 Not Estab. % Final     Lymphocyte Rel %   Date Value Ref Range Status   07/23/2021 44 Not Estab. % Final     Monocyte Rel  %   Date Value Ref Range Status   07/23/2021 6 Not Estab. % Final     Eosinophil Rel %   Date Value Ref Range Status   07/23/2021 2 Not Estab. % Final     Basophil Rel %   Date Value Ref Range Status   07/23/2021 2 Not Estab. % Final     Neutrophils Absolute   Date Value Ref Range Status   07/23/2021 2.8 1.4 - 7.0 x10E3/uL Final     Lymphocytes Absolute   Date Value Ref Range Status   07/23/2021 2.7 0.7 - 3.1 x10E3/uL Final     Monocytes Absolute   Date Value Ref Range Status   07/23/2021 0.4 0.1 - 0.9 x10E3/uL Final     Eosinophils Absolute   Date Value Ref Range Status   07/23/2021 0.1 0.0 - 0.4 x10E3/uL Final     Basophils Absolute   Date Value Ref Range Status   07/23/2021 0.1 0.0 - 0.2 x10E3/uL Final         Triglycerides   Date Value Ref Range Status   07/23/2021 413 (H) 0 - 149 mg/dL Final     HDL Cholesterol   Date Value Ref Range Status   07/23/2021 36 (L) >39 mg/dL Final     LDL Chol Calc (NIH)   Date Value Ref Range Status   07/23/2021 46 0 - 99 mg/dL Final     IMAGING  US abdomen complete    Result Date: 4/19/2023  Fatty infiltration of the liver. Prominent spleen. Films reviewed, interpreted, and dictated by Dr. Choi. Transcribed by Tricia Jack PA-C.    FL esoph swallow funct with cine video    Result Date: 4/13/2023  Normal.  No restrictions required based on the result of this examination.  No increased risk for aspiration with any of the trials.   Images reviewed, interpreted, and dictated by Rosalind Bell MD    ECHOCARDIOGRAM 8/27/2021  Interpretation Summary    • And poor quality echo and Doppler study was obtained.  • Left ventricle cavity size is normal with mild concentric left ventricular hypertrophy.  • Left ventricle is hypokinetic with LV ejection fraction around 46 to 50%  • Left ventricular diastolic function is consistent with (grade I) impaired relaxation.  • Aortic valve appears to be normal with no evidence of aortic stenosis or aortic regurgitation.  • Mitral valve not  very well visualized but appears to be normal with no evidence of aortic stenosis or aortic regurgitation.  • Tricuspid and pulmonic valve echoes are not very well visualized but no significant abnormality by Doppler noted.  • No pericardial effusion detected.      Treadmill Stress Test 8/27/2021  Interpretation Summary    • A stress test was performed following the Lv protocol.  • Resting EKG showed sinus rhythm rate of 70/min, right superior axis and poor R wave progression across the anterior chest leads noted. Old anterior wall myocardial infarction cannot be excluded.  • Patient walked 8:51 on treadmill reaching target heart rate, patient denied any chest discomfort  • ST segments did not show any diagnostic changes. No significant arrhythmia detected.  • Heart rate and blood pressure response was appropriate.  • Stress test is felt to be negative for significant exercise-induced myocardial ischemia.           Assessment & Plan   Diagnoses and all orders for this visit:    1. Abnormal echocardiogram (Primary)  Discussed and reviewed echocardiogram  Plan to adjust medications once testing has been completed and labs have been performed.    2. LV dysfunction  -     Basic Metabolic Panel; Future  -     BNP; Future  -     Adult Transthoracic Echo Complete W/ Cont if Necessary Per Protocol; Future  -     Stress Test With Myocardial Perfusion (1 Day); Future  Ischemic evaluation will be arranged    3. Primary hypertension  -     ECG 12 Lead  Controlled, continue to monitor, sodium restrictions recommended    4. Mixed hyperlipidemia  Continue statin, discussed and reviewed recent labs with elevated triglycerides, will plan to start fenofibrate and continue to monitor. Heart healthy diet    5. Type 2 diabetes mellitus with other specified complication, without long-term current use of insulin  Recommend tight glycemic control for overall health benefit    6. Multiple risk factors for coronary artery disease  Further  evaluation will be arranged    7. Morbid obesity with BMI of 40.0-44.9, adult  Lifestyle modifications including heart healthy diet, regular exercise, maintenance of desirable body weight and avoidance of tobacco products.    Other orders  -     fenofibrate (TRICOR) 145 MG tablet; Take 1 tablet by mouth Daily.  Dispense: 30 tablet; Refill: 3      Patient will need to undergo sleep apnea evaluation    Follow-up in 4 weeks to assess and review testing, sooner if needed

## 2023-04-21 NOTE — LETTER
May 11, 2023     Tracie Rizvi MD  [address]  [address] [address]    Patient: Cezar Burden   YOB: 1990   Date of Visit: 4/21/2023       Dear Tracie Rizvi MD,    Cezar Burden was in my office today. Below are the relevant portions of my assessment and plan of care.           If you have questions, please do not hesitate to call me. I look forward to following Cezar along with you.         Sincerely,        DALE Bates        CC: No Recipients

## 2023-05-04 ENCOUNTER — TELEPHONE (OUTPATIENT)
Dept: CARDIOLOGY | Facility: CLINIC | Age: 33
End: 2023-05-04
Payer: MEDICAID

## 2023-05-04 NOTE — TELEPHONE ENCOUNTER
Called pt and informed him of the following per Irma Marcelino APRN   His echocardiogram in 2021 showed his LVEF at 46-50%, which is mildly lower than normal. We are awaiting further testing results. When he performed his treadmill stress test in 2021 he was able to walk on the treadmill without significant problems. Exercise is generally good for the heart muscle and can make it stronger. I will leave it up to them if he goes to the gym or not. If he does he needs to take it slow and gradual. If he has any symptoms he needs to stop and not push himself further. I would not recommend any type of prework mixes/ protein supplements currently.   Irma

## 2023-05-04 NOTE — TELEPHONE ENCOUNTER
Patients wife called wants to know if it is safe to go to the gym and work out and if there are exercises that should be limited, they just got a gym membership but with his ejection fraction they want to know what is safe to do. Please call her at:  [place]  [phone]

## 2023-05-10 ENCOUNTER — HOSPITAL ENCOUNTER (OUTPATIENT)
Dept: CARDIOLOGY | Facility: HOSPITAL | Age: 33
Discharge: HOME | End: 2023-05-10
Admitting: NURSE PRACTITIONER
Payer: MEDICAID

## 2023-05-10 DIAGNOSIS — I51.9 LV DYSFUNCTION: ICD-10-CM

## 2023-05-10 LAB
BH CV ECHO MEAS - AO MAX PG: 7.8 MMHG
BH CV ECHO MEAS - AO MEAN PG: 5 MMHG
BH CV ECHO MEAS - AO ROOT DIAM: 2.8 CM
BH CV ECHO MEAS - AO V2 MAX: 140 CM/SEC
BH CV ECHO MEAS - AO V2 VTI: 28.1 CM
BH CV ECHO MEAS - EDV(CUBED): 59.3 ML
BH CV ECHO MEAS - EDV(MOD-SP4): 75.7 ML
BH CV ECHO MEAS - EF(MOD-SP4): 53.6 %
BH CV ECHO MEAS - ESV(CUBED): 50.7 ML
BH CV ECHO MEAS - ESV(MOD-SP4): 35.1 ML
BH CV ECHO MEAS - FS: 5.1 %
BH CV ECHO MEAS - IVS/LVPW: 1 CM
BH CV ECHO MEAS - IVSD: 1.4 CM
BH CV ECHO MEAS - LA DIMENSION: 3.3 CM
BH CV ECHO MEAS - LAT PEAK E' VEL: 10.9 CM/SEC
BH CV ECHO MEAS - LV DIASTOLIC VOL/BSA (35-75): 29.3 CM2
BH CV ECHO MEAS - LV MASS(C)D: 201.5 GRAMS
BH CV ECHO MEAS - LV SYSTOLIC VOL/BSA (12-30): 13.6 CM2
BH CV ECHO MEAS - LVIDD: 3.9 CM
BH CV ECHO MEAS - LVIDS: 3.7 CM
BH CV ECHO MEAS - LVOT AREA: 2.5 CM2
BH CV ECHO MEAS - LVOT DIAM: 1.8 CM
BH CV ECHO MEAS - LVPWD: 1.4 CM
BH CV ECHO MEAS - MED PEAK E' VEL: 11 CM/SEC
BH CV ECHO MEAS - MV A MAX VEL: 82.8 CM/SEC
BH CV ECHO MEAS - MV E MAX VEL: 102 CM/SEC
BH CV ECHO MEAS - MV E/A: 1.23
BH CV ECHO MEAS - PA ACC TIME: 0.1 SEC
BH CV ECHO MEAS - PA PR(ACCEL): 36.3 MMHG
BH CV ECHO MEAS - SI(MOD-SP4): 15.7 ML/M2
BH CV ECHO MEAS - SV(MOD-SP4): 40.6 ML
BH CV ECHO MEAS - TAPSE (>1.6): 1.89 CM
BH CV ECHO MEASUREMENTS AVERAGE E/E' RATIO: 9.32
LEFT ATRIUM VOLUME INDEX: 17.9 ML/M2
MAXIMAL PREDICTED HEART RATE: 187 BPM
STRESS TARGET HR: 159 BPM

## 2023-05-10 PROCEDURE — 93306 TTE W/DOPPLER COMPLETE: CPT

## 2023-05-10 PROCEDURE — 93306 TTE W/DOPPLER COMPLETE: CPT | Performed by: INTERNAL MEDICINE

## 2023-05-15 ENCOUNTER — TELEPHONE (OUTPATIENT)
Dept: CARDIOLOGY | Facility: CLINIC | Age: 33
End: 2023-05-15
Payer: MEDICAID

## 2023-05-15 NOTE — TELEPHONE ENCOUNTER
----- Message from DALE Short sent at 5/15/2023  3:54 PM EDT -----  Echocardiogram with LVEF of 51-55 % which is low normal. Will discuss further at follow up visit.

## 2023-05-15 NOTE — TELEPHONE ENCOUNTER
Called pt and informed him of the following per Irma Marcelino APRN  Echocardiogram with LVEF of 51-55 % which is low normal. Will discuss further at follow up visit.

## 2023-06-02 ENCOUNTER — HOSPITAL ENCOUNTER (OUTPATIENT)
Dept: CARDIOLOGY | Facility: HOSPITAL | Age: 33
Discharge: HOME | End: 2023-06-02

## 2023-06-02 ENCOUNTER — HOSPITAL ENCOUNTER (OUTPATIENT)
Dept: NUCLEAR MEDICINE | Facility: HOSPITAL | Age: 33
Discharge: HOME | End: 2023-06-02

## 2023-06-02 DIAGNOSIS — I51.9 LV DYSFUNCTION: ICD-10-CM

## 2023-06-02 LAB
BH CV NUCLEAR PRIOR STUDY: 3
BH CV REST NUCLEAR ISOTOPE DOSE: 9.5 MCI
BH CV STRESS BP STAGE 1: NORMAL
BH CV STRESS BP STAGE 2: NORMAL
BH CV STRESS COMMENTS STAGE 1: NORMAL
BH CV STRESS DOSE REGADENOSON STAGE 1: 0.4
BH CV STRESS DURATION MIN STAGE 1: 3
BH CV STRESS DURATION MIN STAGE 2: 3
BH CV STRESS DURATION SEC STAGE 1: 0
BH CV STRESS DURATION SEC STAGE 2: 0
BH CV STRESS DURATION SEC STAGE 3: 58
BH CV STRESS GRADE STAGE 1: 10
BH CV STRESS GRADE STAGE 2: 12
BH CV STRESS GRADE STAGE 3: 14
BH CV STRESS HR STAGE 1: 117
BH CV STRESS HR STAGE 2: 139
BH CV STRESS HR STAGE 3: 157
BH CV STRESS METS STAGE 1: 5
BH CV STRESS METS STAGE 2: 7.5
BH CV STRESS METS STAGE 3: 10
BH CV STRESS NUCLEAR ISOTOPE DOSE: 28.1 MCI
BH CV STRESS PROTOCOL 1: NORMAL
BH CV STRESS RECOVERY BP: NORMAL MMHG
BH CV STRESS RECOVERY HR: 99 BPM
BH CV STRESS SPEED STAGE 1: 1.7
BH CV STRESS SPEED STAGE 2: 2.5
BH CV STRESS SPEED STAGE 3: 3.4
BH CV STRESS STAGE 1: 1
BH CV STRESS STAGE 2: 2
BH CV STRESS STAGE 3: 3
LV EF NUC BP: 61 %
MAXIMAL PREDICTED HEART RATE: 187 BPM
PERCENT MAX PREDICTED HR: 83.96 %
STRESS BASELINE BP: NORMAL MMHG
STRESS BASELINE HR: 82 BPM
STRESS PERCENT HR: 99 %
STRESS POST ESTIMATED WORKLOAD: 9.9 METS
STRESS POST EXERCISE DUR MIN: 6 MIN
STRESS POST EXERCISE DUR SEC: 58 SEC
STRESS POST PEAK BP: NORMAL MMHG
STRESS POST PEAK HR: 157 BPM
STRESS TARGET HR: 159 BPM

## 2023-06-02 PROCEDURE — 0 TECHNETIUM SESTAMIBI: Performed by: NURSE PRACTITIONER

## 2023-06-02 PROCEDURE — A9500 TC99M SESTAMIBI: HCPCS | Performed by: NURSE PRACTITIONER

## 2023-06-02 PROCEDURE — 93017 CV STRESS TEST TRACING ONLY: CPT

## 2023-06-02 PROCEDURE — 78452 HT MUSCLE IMAGE SPECT MULT: CPT

## 2023-06-02 RX ADMIN — TECHNETIUM TC 99M SESTAMIBI 1 DOSE: 1 INJECTION INTRAVENOUS at 09:45

## 2023-06-02 RX ADMIN — TECHNETIUM TC 99M SESTAMIBI 1 DOSE: 1 INJECTION INTRAVENOUS at 11:07

## 2023-07-25 ENCOUNTER — OFFICE VISIT (OUTPATIENT)
Dept: CARDIOLOGY | Facility: CLINIC | Age: 33
End: 2023-07-25
Payer: MEDICAID

## 2023-07-25 VITALS
OXYGEN SATURATION: 96 % | SYSTOLIC BLOOD PRESSURE: 152 MMHG | WEIGHT: 313 LBS | HEART RATE: 86 BPM | DIASTOLIC BLOOD PRESSURE: 92 MMHG | BODY MASS INDEX: 40.17 KG/M2 | HEIGHT: 74 IN

## 2023-07-25 DIAGNOSIS — I51.9 LV DYSFUNCTION: Primary | ICD-10-CM

## 2023-07-25 DIAGNOSIS — E78.2 MIXED HYPERLIPIDEMIA: ICD-10-CM

## 2023-07-25 DIAGNOSIS — E66.01 MORBID OBESITY WITH BMI OF 40.0-44.9, ADULT: ICD-10-CM

## 2023-07-25 DIAGNOSIS — E11.69 TYPE 2 DIABETES MELLITUS WITH OTHER SPECIFIED COMPLICATION, WITHOUT LONG-TERM CURRENT USE OF INSULIN: ICD-10-CM

## 2023-07-25 DIAGNOSIS — I10 HYPERTENSION, UNSPECIFIED TYPE: ICD-10-CM

## 2023-07-25 PROCEDURE — 3077F SYST BP >= 140 MM HG: CPT | Performed by: NURSE PRACTITIONER

## 2023-07-25 PROCEDURE — 1159F MED LIST DOCD IN RCRD: CPT | Performed by: NURSE PRACTITIONER

## 2023-07-25 PROCEDURE — 3080F DIAST BP >= 90 MM HG: CPT | Performed by: NURSE PRACTITIONER

## 2023-07-25 PROCEDURE — 1160F RVW MEDS BY RX/DR IN RCRD: CPT | Performed by: NURSE PRACTITIONER

## 2023-07-25 PROCEDURE — 99214 OFFICE O/P EST MOD 30 MIN: CPT | Performed by: NURSE PRACTITIONER

## 2023-07-25 RX ORDER — PANTOPRAZOLE SODIUM 40 MG/1
40 TABLET, DELAYED RELEASE ORAL DAILY
COMMUNITY

## 2023-07-25 RX ORDER — FENOFIBRATE 145 MG/1
145 TABLET, COATED ORAL DAILY
Qty: 90 TABLET | Refills: 1 | Status: SHIPPED | OUTPATIENT
Start: 2023-07-25

## 2023-07-25 RX ORDER — CARVEDILOL 3.12 MG/1
3.12 TABLET ORAL 2 TIMES DAILY
Qty: 60 TABLET | Refills: 2 | Status: SHIPPED | OUTPATIENT
Start: 2023-07-25

## 2023-07-25 NOTE — PROGRESS NOTES
Subjective     Cezar Burden is a 33 y.o. male.   Chief Complaint   Patient presents with    Results     Labs    Hypertension     Today     History of Present Illness   Cezar burden is a 33-year-old male who presents to clinic today for cardiology follow-up.    LV dysfunction 46 to 50% from echocardiogram 8/30/2021.  He recently completed an echocardiogram with hypokinesis noted of the septum and borderline low LVEF at 51 to 55%.  He denies worsening dyspnea, orthopnea, chest pains, swelling or palpitations.    Hypertension currently on lisinopril - HCTZ 20/25, no home monitoring. He has tried to restrict sodium intake. Hyperlipidemia currently on Lipitor 40 mg nightly and Tricor 145 mg daily. Most recent labs from July 23 with triglycerides at 305 and LDL at goal at 11. He has had problems with pancreatitis as a result of elevated triglycerides previously.  He states he has recently made some lifestyle changes trying to improve his dietary intake and overall health.      He is a former smoker and does vape currently. History significant for DM, currently on oral medications, managed by PCP.  Most recent hemoglobin A1c was 9.1.    Patient Active Problem List   Diagnosis    Murmur    HTN (hypertension)    HLD (hyperlipidemia)    Diabetes    Acid reflux    Hypertriglyceridemia    Anxiety    Current every day nicotine vaping    Morbid obesity with BMI of 40.0-44.9, adult    Pancreatitis    Gout    Fatigue     Past Medical History:   Diagnosis Date    Acid reflux     daily omeprazole controls symptoms well, no past EGD.     Anxiety     Current every day nicotine vaping     Diabetes 2014    Fatigue     Gout     last flare approx 2016, no issues since    HLD (hyperlipidemia)     HTN (hypertension)     Hypertriglyceridemia     Morbid obesity with BMI of 40.0-44.9, adult     Murmur     childhood    Pancreatitis 2020    hypertriglyceridemia, no procedures.      Past Surgical History:   Procedure Laterality Date    COLONOSCOPY       ENDOSCOPY       Family History   Problem Relation Age of Onset    Obesity Mother     Diabetes Mother     Hypertension Mother     Sleep apnea Mother     Bipolar disorder Mother     Obesity Father     Hypertension Father     Diabetes Maternal Grandmother     Hypertension Maternal Grandmother     Cancer Maternal Grandmother         skin     Social History     Tobacco Use    Smoking status: Former    Smokeless tobacco: Never    Tobacco comments:     vapes   Vaping Use    Vaping Use: Every day    Substances: Nicotine, Flavoring    Devices: Pre-filled or refillable cartridge, Refillable tank   Substance Use Topics    Alcohol use: Not Currently    Drug use: Never     The following portions of the patient's history were reviewed and updated as appropriate: allergies, current medications, past family history, past medical history, past social history, past surgical history and problem list.    No Known Allergies      Current Outpatient Medications:     atorvastatin (LIPITOR) 40 MG tablet, Take 1 tablet by mouth every night at bedtime., Disp: , Rfl:     fenofibrate (TRICOR) 145 MG tablet, Take 1 tablet by mouth Daily., Disp: 90 tablet, Rfl: 1    glimepiride (AMARYL) 4 MG tablet, Take 1 tablet by mouth Daily., Disp: , Rfl:     lisinopril-hydrochlorothiazide (PRINZIDE,ZESTORETIC) 20-25 MG per tablet, Take 1 tablet by mouth Daily. for blood pressure., Disp: , Rfl:     metFORMIN (GLUCOPHAGE) 1000 MG tablet, Take 1 tablet by mouth 2 (two) times a day., Disp: , Rfl:     pantoprazole (PROTONIX) 40 MG EC tablet, Take 1 tablet by mouth Daily., Disp: , Rfl:     carvedilol (COREG) 3.125 MG tablet, Take 1 tablet by mouth 2 (Two) Times a Day., Disp: 60 tablet, Rfl: 2    Review of Systems   Constitutional:  Negative for activity change, appetite change, chills, diaphoresis, fatigue and fever.   HENT:  Negative for congestion, drooling, ear discharge, ear pain, mouth sores, nosebleeds, postnasal drip, rhinorrhea, sinus pressure,  "sneezing and sore throat.    Eyes:  Negative for pain, discharge and visual disturbance.   Respiratory:  Negative for cough, chest tightness, shortness of breath and wheezing.    Cardiovascular:  Negative for chest pain, palpitations and leg swelling.   Gastrointestinal:  Negative for abdominal pain, constipation, diarrhea, nausea and vomiting.   Endocrine: Negative for cold intolerance, heat intolerance, polydipsia, polyphagia and polyuria.   Musculoskeletal:  Negative for arthralgias, myalgias and neck pain.   Skin:  Negative for rash and wound.   Neurological:  Negative for dizziness, syncope, speech difficulty, weakness, light-headedness and headaches.   Hematological:  Negative for adenopathy. Does not bruise/bleed easily.   Psychiatric/Behavioral:  Negative for confusion, dysphoric mood and sleep disturbance. The patient is not nervous/anxious.    All other systems reviewed and are negative.    /92 (BP Location: Right arm, Patient Position: Sitting, Cuff Size: Large Adult)   Pulse 86   Ht 188 cm (74\")   Wt (!) 142 kg (313 lb)   SpO2 96%   BMI 40.19 kg/mý   BP recheck 142/86    Objective   No Known Allergies    Physical Exam  Vitals reviewed.   Constitutional:       Appearance: Normal appearance. He is well-developed. He is obese.   HENT:      Head: Normocephalic.   Eyes:      Conjunctiva/sclera: Conjunctivae normal.   Neck:      Thyroid: No thyromegaly.      Vascular: No carotid bruit or JVD.   Cardiovascular:      Rate and Rhythm: Normal rate and regular rhythm.   Pulmonary:      Effort: Pulmonary effort is normal.      Breath sounds: Normal breath sounds.   Musculoskeletal:      Cervical back: Neck supple.      Right lower leg: No edema.      Left lower leg: No edema.   Skin:     General: Skin is warm and dry.   Neurological:      Mental Status: He is alert and oriented to person, place, and time.   Psychiatric:         Attention and Perception: Attention normal.         Mood and Affect: Mood " normal.         Speech: Speech normal.         Behavior: Behavior normal. Behavior is cooperative.         Cognition and Memory: Cognition normal.       LABS  WBC   Date Value Ref Range Status   07/23/2021 6.1 3.4 - 10.8 x10E3/uL Final     RBC   Date Value Ref Range Status   07/23/2021 5.80 4.14 - 5.80 x10E6/uL Final     Hemoglobin   Date Value Ref Range Status   07/23/2021 16.8 13.0 - 17.7 g/dL Final     Hematocrit   Date Value Ref Range Status   07/23/2021 48.4 37.5 - 51.0 % Final     MCV   Date Value Ref Range Status   07/23/2021 83 79 - 97 fL Final     MCH   Date Value Ref Range Status   07/23/2021 29.0 26.6 - 33.0 pg Final     MCHC   Date Value Ref Range Status   07/23/2021 34.7 31.5 - 35.7 g/dL Final     RDW   Date Value Ref Range Status   07/23/2021 12.9 11.6 - 15.4 % Final     Platelets   Date Value Ref Range Status   07/23/2021 262 150 - 450 x10E3/uL Final     Neutrophil Rel %   Date Value Ref Range Status   07/23/2021 46 Not Estab. % Final     Lymphocyte Rel %   Date Value Ref Range Status   07/23/2021 44 Not Estab. % Final     Monocyte Rel %   Date Value Ref Range Status   07/23/2021 6 Not Estab. % Final     Eosinophil Rel %   Date Value Ref Range Status   07/23/2021 2 Not Estab. % Final     Basophil Rel %   Date Value Ref Range Status   07/23/2021 2 Not Estab. % Final     Neutrophils Absolute   Date Value Ref Range Status   07/23/2021 2.8 1.4 - 7.0 x10E3/uL Final     Lymphocytes Absolute   Date Value Ref Range Status   07/23/2021 2.7 0.7 - 3.1 x10E3/uL Final     Monocytes Absolute   Date Value Ref Range Status   07/23/2021 0.4 0.1 - 0.9 x10E3/uL Final     Eosinophils Absolute   Date Value Ref Range Status   07/23/2021 0.1 0.0 - 0.4 x10E3/uL Final     Basophils Absolute   Date Value Ref Range Status   07/23/2021 0.1 0.0 - 0.2 x10E3/uL Final       Triglycerides   Date Value Ref Range Status   07/23/2021 413 (H) 0 - 149 mg/dL Final     HDL Cholesterol   Date Value Ref Range Status   07/23/2021 36 (L) >39  mg/dL Final     LDL Chol Calc ([facility])   Date Value Ref Range Status   07/23/2021 46 0 - 99 mg/dL Final     IMAGING   US abdomen complete    Result Date: 4/19/2023  Fatty infiltration of the liver. Prominent spleen. Films reviewed, interpreted, and dictated by Dr. Choi. Transcribed by Tricia Jack PA-C.    FL esoph swallow funct with cine video    Result Date: 4/13/2023  Normal.  No restrictions required based on the result of this examination.  No increased risk for aspiration with any of the trials.   Images reviewed, interpreted, and dictated by Rosalind Bell MD    Nuclear Stress Test 6/2/2023  Interpretation Summary       A stress test was performed following the Lv protocol.    Resting EKG showed sinus rhythm at a rate of 82 bpm.  Marked right axis deviation, left posterior fascicular block.  Nonspecific T wave changes. anterior wall myocardial infarction age undetermined    Patient walked 6:58 on treadmill reaching target heart rate, patient denied any chest discomfort during exercise    Blood pressure demonstrated a hypertensive response to stress. Heart rate demonstrated a normal response to stress. Overall the patient's exercise capacity was normal.    ST segments did not show any diagnostic changes.  No significant arrhythmia detected.    Findings consistent with a normal ECG stress test.    Left ventricular ejection fraction is normal (Calculated EF = 61%).    Myocardial perfusion imaging indicates a normal myocardial perfusion study with no evidence of ischemia.    Impressions are consistent with a low risk study.    There is no prior study available for comparison.      Echocardiogram 5/10/2023  Interpretation Summary       Technically poor quality echo and Doppler study was obtained.    Normal left ventricular cavity size was noted, septum is hypokinetic.    Left ventricular systolic function is low normal. Left ventricular ejection fraction appears to be 51 - 55%.    Left ventricular  diastolic function was normal.    Right-sided structures are not very well visualized.   No significant valvular heart disease noted   .    No pericardial effusion detected.    Assessment & Plan   Diagnoses and all orders for this visit:    1. LV dysfunction (Primary)  -     BNP; Future  -     Basic Metabolic Panel; Future  Discussed and reviewed nuclear stress test and echocardiogram  Continue on lisinopril  Start Coreg 3.125 mg twice daily for heart and blood pressure.    2. Hypertension, unspecified type  Slightly elevated in clinic, will continue to monitor with medication changes, sodium restrictions recommended, routine monitoring recommended.  He is asked to call the clinic if he has further concerns regarding his BP.    3. Mixed hyperlipidemia  Discussed and reviewed recent labs, LDL is at goal, triglycerides remain elevated, continue current therapy, low-fat/heart healthy diet recommended    4. Type 2 diabetes mellitus with other specified complication, without long-term current use of insulin  Recommend tight glycemic control for overall health benefit    5. Morbid obesity with BMI of 40.0-44.9, adult  Lifestyle modifications including heart healthy diet, regular exercise, maintenance of desirable body weight and avoidance of tobacco products    Other orders  -     carvedilol (COREG) 3.125 MG tablet; Take 1 tablet by mouth 2 (Two) Times a Day.  Dispense: 60 tablet; Refill: 2  -     fenofibrate (TRICOR) 145 MG tablet; Take 1 tablet by mouth Daily.  Dispense: 90 tablet; Refill: 1      Review of medical record  Weight changes, labs recommended with further recommendations pending  Follow-up in 2 months, sooner if needed.

## 2023-07-25 NOTE — LETTER
August 7, 2023     Tracie Rizvi MD  [address] [facility] [address]  [place] [address]    Patient: Cezar Burden   YOB: 1990   Date of Visit: 7/25/2023       Dear Tracie Rizvi MD    Cezar Burden was in my office today. Below is a copy of my note.    If you have questions, please do not hesitate to call me. I look forward to following Cezar along with you.         Sincerely,        Irma Marcelino, DALE        CC: No Recipients    Subjective    Cezar Burden is a 33 y.o. male.   Chief Complaint   Patient presents with    Results     Labs    Hypertension     Today     History of Present Illness   Cezar burden is a 33-year-old male who presents to clinic today for cardiology follow-up.    LV dysfunction 46 to 50% from echocardiogram 8/30/2021.  He recently completed an echocardiogram with hypokinesis noted of the septum and borderline low LVEF at 51 to 55%.  He denies worsening dyspnea, orthopnea, chest pains, swelling or palpitations.    Hypertension currently on lisinopril - HCTZ 20/25, no home monitoring. He has tried to restrict sodium intake. Hyperlipidemia currently on Lipitor 40 mg nightly and Tricor 145 mg daily. Most recent labs from July 23 with triglycerides at 305 and LDL at goal at 11. He has had problems with pancreatitis as a result of elevated triglycerides previously.  He states he has recently made some lifestyle changes trying to improve his dietary intake and overall health.      He is a former smoker and does vape currently. History significant for DM, currently on oral medications, managed by PCP.  Most recent hemoglobin A1c was 9.1.    Patient Active Problem List   Diagnosis    Murmur    HTN (hypertension)    HLD (hyperlipidemia)    Diabetes    Acid reflux    Hypertriglyceridemia    Anxiety    Current every day nicotine vaping    Morbid obesity with BMI of 40.0-44.9, adult    Pancreatitis    Gout    Fatigue     Past Medical History:   Diagnosis Date     Acid reflux     daily omeprazole controls symptoms well, no past EGD.     Anxiety     Current every day nicotine vaping     Diabetes 2014    Fatigue     Gout     last flare approx 2016, no issues since    HLD (hyperlipidemia)     HTN (hypertension)     Hypertriglyceridemia     Morbid obesity with BMI of 40.0-44.9, adult     Murmur     childhood    Pancreatitis 2020    hypertriglyceridemia, no procedures.      Past Surgical History:   Procedure Laterality Date    COLONOSCOPY      ENDOSCOPY       Family History   Problem Relation Age of Onset    Obesity Mother     Diabetes Mother     Hypertension Mother     Sleep apnea Mother     Bipolar disorder Mother     Obesity Father     Hypertension Father     Diabetes Maternal Grandmother     Hypertension Maternal Grandmother     Cancer Maternal Grandmother         skin     Social History     Tobacco Use    Smoking status: Former    Smokeless tobacco: Never    Tobacco comments:     vapes   Vaping Use    Vaping Use: Every day    Substances: Nicotine, Flavoring    Devices: Pre-filled or refillable cartridge, Refillable tank   Substance Use Topics    Alcohol use: Not Currently    Drug use: Never     The following portions of the patient's history were reviewed and updated as appropriate: allergies, current medications, past family history, past medical history, past social history, past surgical history and problem list.    No Known Allergies      Current Outpatient Medications:     atorvastatin (LIPITOR) 40 MG tablet, Take 1 tablet by mouth every night at bedtime., Disp: , Rfl:     fenofibrate (TRICOR) 145 MG tablet, Take 1 tablet by mouth Daily., Disp: 90 tablet, Rfl: 1    glimepiride (AMARYL) 4 MG tablet, Take 1 tablet by mouth Daily., Disp: , Rfl:     lisinopril-hydrochlorothiazide (PRINZIDE,ZESTORETIC) 20-25 MG per tablet, Take 1 tablet by mouth Daily. for blood pressure., Disp: , Rfl:     metFORMIN (GLUCOPHAGE) 1000 MG tablet, Take 1  "tablet by mouth 2 (two) times a day., Disp: , Rfl:     pantoprazole (PROTONIX) 40 MG EC tablet, Take 1 tablet by mouth Daily., Disp: , Rfl:     carvedilol (COREG) 3.125 MG tablet, Take 1 tablet by mouth 2 (Two) Times a Day., Disp: 60 tablet, Rfl: 2    Review of Systems   Constitutional:  Negative for activity change, appetite change, chills, diaphoresis, fatigue and fever.   HENT:  Negative for congestion, drooling, ear discharge, ear pain, mouth sores, nosebleeds, postnasal drip, rhinorrhea, sinus pressure, sneezing and sore throat.    Eyes:  Negative for pain, discharge and visual disturbance.   Respiratory:  Negative for cough, chest tightness, shortness of breath and wheezing.    Cardiovascular:  Negative for chest pain, palpitations and leg swelling.   Gastrointestinal:  Negative for abdominal pain, constipation, diarrhea, nausea and vomiting.   Endocrine: Negative for cold intolerance, heat intolerance, polydipsia, polyphagia and polyuria.   Musculoskeletal:  Negative for arthralgias, myalgias and neck pain.   Skin:  Negative for rash and wound.   Neurological:  Negative for dizziness, syncope, speech difficulty, weakness, light-headedness and headaches.   Hematological:  Negative for adenopathy. Does not bruise/bleed easily.   Psychiatric/Behavioral:  Negative for confusion, dysphoric mood and sleep disturbance. The patient is not nervous/anxious.    All other systems reviewed and are negative.    /92 (BP Location: Right arm, Patient Position: Sitting, Cuff Size: Large Adult)   Pulse 86   Ht 188 cm (74\")   Wt (!) 142 kg (313 lb)   SpO2 96%   BMI 40.19 kg/mý   BP recheck 142/86    Objective  No Known Allergies    Physical Exam  Vitals reviewed.   Constitutional:       Appearance: Normal appearance. He is well-developed. He is obese.   HENT:      Head: Normocephalic.   Eyes:      Conjunctiva/sclera: Conjunctivae normal.   Neck:      Thyroid: No thyromegaly.      Vascular: No carotid bruit or JVD. "   Cardiovascular:      Rate and Rhythm: Normal rate and regular rhythm.   Pulmonary:      Effort: Pulmonary effort is normal.      Breath sounds: Normal breath sounds.   Musculoskeletal:      Cervical back: Neck supple.      Right lower leg: No edema.      Left lower leg: No edema.   Skin:     General: Skin is warm and dry.   Neurological:      Mental Status: He is alert and oriented to person, place, and time.   Psychiatric:         Attention and Perception: Attention normal.         Mood and Affect: Mood normal.         Speech: Speech normal.         Behavior: Behavior normal. Behavior is cooperative.         Cognition and Memory: Cognition normal.       LABS  WBC   Date Value Ref Range Status   07/23/2021 6.1 3.4 - 10.8 x10E3/uL Final     RBC   Date Value Ref Range Status   07/23/2021 5.80 4.14 - 5.80 x10E6/uL Final     Hemoglobin   Date Value Ref Range Status   07/23/2021 16.8 13.0 - 17.7 g/dL Final     Hematocrit   Date Value Ref Range Status   07/23/2021 48.4 37.5 - 51.0 % Final     MCV   Date Value Ref Range Status   07/23/2021 83 79 - 97 fL Final     MCH   Date Value Ref Range Status   07/23/2021 29.0 26.6 - 33.0 pg Final     MCHC   Date Value Ref Range Status   07/23/2021 34.7 31.5 - 35.7 g/dL Final     RDW   Date Value Ref Range Status   07/23/2021 12.9 11.6 - 15.4 % Final     Platelets   Date Value Ref Range Status   07/23/2021 262 150 - 450 x10E3/uL Final     Neutrophil Rel %   Date Value Ref Range Status   07/23/2021 46 Not Estab. % Final     Lymphocyte Rel %   Date Value Ref Range Status   07/23/2021 44 Not Estab. % Final     Monocyte Rel %   Date Value Ref Range Status   07/23/2021 6 Not Estab. % Final     Eosinophil Rel %   Date Value Ref Range Status   07/23/2021 2 Not Estab. % Final     Basophil Rel %   Date Value Ref Range Status   07/23/2021 2 Not Estab. % Final     Neutrophils Absolute   Date Value Ref Range Status   07/23/2021 2.8 1.4 - 7.0 x10E3/uL Final     Lymphocytes Absolute   Date Value  Ref Range Status   07/23/2021 2.7 0.7 - 3.1 x10E3/uL Final     Monocytes Absolute   Date Value Ref Range Status   07/23/2021 0.4 0.1 - 0.9 x10E3/uL Final     Eosinophils Absolute   Date Value Ref Range Status   07/23/2021 0.1 0.0 - 0.4 x10E3/uL Final     Basophils Absolute   Date Value Ref Range Status   07/23/2021 0.1 0.0 - 0.2 x10E3/uL Final       Triglycerides   Date Value Ref Range Status   07/23/2021 413 (H) 0 - 149 mg/dL Final     HDL Cholesterol   Date Value Ref Range Status   07/23/2021 36 (L) >39 mg/dL Final     LDL Chol Calc ([facility])   Date Value Ref Range Status   07/23/2021 46 0 - 99 mg/dL Final     IMAGING   US abdomen complete    Result Date: 4/19/2023  Fatty infiltration of the liver. Prominent spleen. Films reviewed, interpreted, and dictated by Dr. Choi. Transcribed by Tricia Jack PA-C.    FL esoph swallow funct with cine video    Result Date: 4/13/2023  Normal.  No restrictions required based on the result of this examination.  No increased risk for aspiration with any of the trials.   Images reviewed, interpreted, and dictated by Rosalind Bell MD    Nuclear Stress Test 6/2/2023  Interpretation Summary       A stress test was performed following the Lv protocol.    Resting EKG showed sinus rhythm at a rate of 82 bpm.  Marked right axis deviation, left posterior fascicular block.  Nonspecific T wave changes. anterior wall myocardial infarction age undetermined    Patient walked 6:58 on treadmill reaching target heart rate, patient denied any chest discomfort during exercise    Blood pressure demonstrated a hypertensive response to stress. Heart rate demonstrated a normal response to stress. Overall the patient's exercise capacity was normal.    ST segments did not show any diagnostic changes.  No significant arrhythmia detected.    Findings consistent with a normal ECG stress test.    Left ventricular ejection fraction is normal (Calculated EF = 61%).    Myocardial perfusion  imaging indicates a normal myocardial perfusion study with no evidence of ischemia.    Impressions are consistent with a low risk study.    There is no prior study available for comparison.      Echocardiogram 5/10/2023  Interpretation Summary       Technically poor quality echo and Doppler study was obtained.    Normal left ventricular cavity size was noted, septum is hypokinetic.    Left ventricular systolic function is low normal. Left ventricular ejection fraction appears to be 51 - 55%.    Left ventricular diastolic function was normal.    Right-sided structures are not very well visualized.   No significant valvular heart disease noted   .    No pericardial effusion detected.    Assessment & Plan  Diagnoses and all orders for this visit:    1. LV dysfunction (Primary)  -     BNP; Future  -     Basic Metabolic Panel; Future  Discussed and reviewed nuclear stress test and echocardiogram  Continue on lisinopril  Start Coreg 3.125 mg twice daily for heart and blood pressure.    2. Hypertension, unspecified type  Slightly elevated in clinic, will continue to monitor with medication changes, sodium restrictions recommended, routine monitoring recommended.  He is asked to call the clinic if he has further concerns regarding his BP.    3. Mixed hyperlipidemia  Discussed and reviewed recent labs, LDL is at goal, triglycerides remain elevated, continue current therapy, low-fat/heart healthy diet recommended    4. Type 2 diabetes mellitus with other specified complication, without long-term current use of insulin  Recommend tight glycemic control for overall health benefit    5. Morbid obesity with BMI of 40.0-44.9, adult  Lifestyle modifications including heart healthy diet, regular exercise, maintenance of desirable body weight and avoidance of tobacco products    Other orders  -     carvedilol (COREG) 3.125 MG tablet; Take 1 tablet by mouth 2 (Two) Times a Day.  Dispense: 60 tablet; Refill: 2  -      fenofibrate (TRICOR) 145 MG tablet; Take 1 tablet by mouth Daily.  Dispense: 90 tablet; Refill: 1      Review of medical record  Weight changes, labs recommended with further recommendations pending  Follow-up in 2 months, sooner if needed.

## 2023-09-25 ENCOUNTER — TELEPHONE (OUTPATIENT)
Dept: CARDIOLOGY | Facility: CLINIC | Age: 33
End: 2023-09-25

## 2023-09-25 NOTE — TELEPHONE ENCOUNTER
Caller: Corrina Burden    Relationship: Emergency Contact    Best call back number: [phone]     What was the call regarding: PT CONTACT REPORTS THAT THIS PT WAS SUPPOSED TO BE CALLED FOR SOME TESTING REVOLVING AROUND LOOKING FOR FLUID IN HIS HEART, HAS NOT BEEN CALLED YET. PLEASE ADVISE. APPT ON 10/3/23.

## 2023-09-28 ENCOUNTER — TRANSCRIBE ORDERS (OUTPATIENT)
Dept: ADMINISTRATIVE | Facility: HOSPITAL | Age: 33
End: 2023-09-28
Payer: MEDICAID

## 2023-09-28 DIAGNOSIS — R59.0 SUBMANDIBULAR LYMPHADENOPATHY: Primary | ICD-10-CM

## 2023-09-29 ENCOUNTER — TRANSCRIBE ORDERS (OUTPATIENT)
Dept: ADMINISTRATIVE | Facility: HOSPITAL | Age: 33
End: 2023-09-29
Payer: MEDICAID

## 2023-09-29 DIAGNOSIS — R59.0 CERVICAL ADENOPATHY: Primary | ICD-10-CM

## 2024-08-14 ENCOUNTER — OFFICE VISIT (OUTPATIENT)
Dept: CARDIOLOGY | Facility: CLINIC | Age: 34
End: 2024-08-14
Payer: MEDICAID

## 2024-08-14 VITALS
BODY MASS INDEX: 38.12 KG/M2 | HEART RATE: 82 BPM | WEIGHT: 297 LBS | SYSTOLIC BLOOD PRESSURE: 125 MMHG | HEIGHT: 74 IN | OXYGEN SATURATION: 98 % | RESPIRATION RATE: 18 BRPM | DIASTOLIC BLOOD PRESSURE: 81 MMHG

## 2024-08-14 DIAGNOSIS — Z91.89 MULTIPLE RISK FACTORS FOR CORONARY ARTERY DISEASE: ICD-10-CM

## 2024-08-14 DIAGNOSIS — I51.9 LV DYSFUNCTION: Primary | ICD-10-CM

## 2024-08-14 DIAGNOSIS — E78.1 HYPERTRIGLYCERIDEMIA: ICD-10-CM

## 2024-08-14 DIAGNOSIS — I10 HYPERTENSION, UNSPECIFIED TYPE: ICD-10-CM

## 2024-08-14 DIAGNOSIS — R00.2 PALPITATIONS: ICD-10-CM

## 2024-08-14 DIAGNOSIS — E11.9 TYPE 2 DIABETES MELLITUS WITHOUT COMPLICATION, WITHOUT LONG-TERM CURRENT USE OF INSULIN: ICD-10-CM

## 2024-08-14 DIAGNOSIS — R06.00 DYSPNEA, UNSPECIFIED TYPE: ICD-10-CM

## 2024-08-14 RX ORDER — ERGOCALCIFEROL 1.25 MG/1
CAPSULE ORAL
COMMUNITY
Start: 2024-07-10

## 2024-08-14 RX ORDER — EMPAGLIFLOZIN 10 MG/1
10 TABLET, FILM COATED ORAL
COMMUNITY
Start: 2024-07-10

## 2024-08-14 RX ORDER — METOPROLOL SUCCINATE 25 MG/1
25 TABLET, EXTENDED RELEASE ORAL DAILY
Qty: 30 TABLET | Refills: 3 | Status: SHIPPED | OUTPATIENT
Start: 2024-08-14

## 2024-08-14 NOTE — PROGRESS NOTES
Subjective     Cezar Burden is a 34 y.o. male.   Chief Complaint   Patient presents with    LV dysfunction     1 year fu       History of Present Illness   Cezar Burden is a 34-year-old male who presents to clinic today for cardiology follow-up.  He has not been seen in about a year.  He states he has been dealing with illness with his father but his father is doing better.  He plans to focus more on his health.     LV dysfunction 46 to 50% from echocardiogram 8/30/2021.  Echocardiogram with hypokinesis noted of the septum and borderline low LVEF at 51 to 55% from echocardiogram on 5/10/2023.  He denies dyspnea, orthopnea, or swelling.  He was started on Coreg at last visit and reports tolerating it well but he ran out and did not have medicine refilled.      Hypertension currently on lisinopril - HCTZ 20/25 daily. He reports taking it at night. Home monitoring with normal readings.  He reports he occasionally misses a dose as his blood pressure is low prior to medication.  He has tried to restrict sodium intake.     Hyperlipidemia was previously on Lipitor 40 mg nightly and Tricor 145 mg daily. He reports he is due for labs for his PCP.  He reports compliance with fenofibrate but reports his Lipitor was causing myalgias therefore he has discontinued and plans to talk to his primary about other treatment options. He states he has recently made some lifestyle changes trying to improve his dietary intake and overall health.      He is a former smoker and does vape currently. History significant for DM, currently on oral medications, managed by PCP.      He states he was at [facility] for palpitations recently and was advised to follow-up with cardiology.  He denies dizziness or syncope.  He notices at times his heart beat is up while sitting and he is more sensitive to his heartbeat and can feel it easier.  Recent labs without thyroid disease or anemia    Patient Active Problem List   Diagnosis    Murmur     HTN (hypertension)    HLD (hyperlipidemia)    Diabetes    Acid reflux    Hypertriglyceridemia    Anxiety    Current every day nicotine vaping    Morbid obesity with BMI of 40.0-44.9, adult    Pancreatitis    Gout    Fatigue     Past Medical History:   Diagnosis Date    Acid reflux     daily omeprazole controls symptoms well, no past EGD.     Anxiety     Current every day nicotine vaping     Diabetes 2014    Fatigue     Gout     last flare approx 2016, no issues since    HLD (hyperlipidemia)     HTN (hypertension)     Hypertriglyceridemia     Morbid obesity with BMI of 40.0-44.9, adult     Murmur     childhood    Pancreatitis 2020    hypertriglyceridemia, no procedures.      Past Surgical History:   Procedure Laterality Date    COLONOSCOPY      ENDOSCOPY         Family History   Problem Relation Age of Onset    Obesity Mother     Diabetes Mother     Hypertension Mother     Sleep apnea Mother     Bipolar disorder Mother     Obesity Father     Hypertension Father     Skin cancer Father     Diabetes Maternal Grandmother     Hypertension Maternal Grandmother     Cancer Maternal Grandmother         skin     Social History     Tobacco Use    Smoking status: Former    Smokeless tobacco: Never    Tobacco comments:     vapes   Vaping Use    Vaping status: Every Day    Substances: Nicotine, Flavoring    Devices: Pre-filled or refillable cartridge, Refillable tank   Substance Use Topics    Alcohol use: Not Currently    Drug use: Never         The following portions of the patient's history were reviewed and updated as appropriate: allergies, current medications, past family history, past medical history, past social history, past surgical history and problem list.    No Known Allergies      Current Outpatient Medications:     fenofibrate (TRICOR) 145 MG tablet, Take 1 tablet by mouth Daily., Disp: 90 tablet, Rfl: 1    glimepiride (AMARYL) 4 MG tablet, Take 1 tablet by mouth Daily., Disp: , Rfl:     Jardiance 10 MG tablet  tablet, 1 tablet., Disp: , Rfl:     lisinopril-hydrochlorothiazide (PRINZIDE,ZESTORETIC) 20-25 MG per tablet, Take 1 tablet by mouth Daily. for blood pressure., Disp: , Rfl:     metFORMIN (GLUCOPHAGE) 1000 MG tablet, Take 1 tablet by mouth 2 (two) times a day., Disp: , Rfl:     vitamin D (ERGOCALCIFEROL) 1.25 MG (26052 UT) capsule capsule, TAKE ONE CAPSULE BY MOUTH ONCE EVERY WEEK FOR VITAMIN D DEFICIENCY, Disp: , Rfl:     metoprolol succinate XL (TOPROL-XL) 25 MG 24 hr tablet, Take 1 tablet by mouth Daily., Disp: 30 tablet, Rfl: 3    pantoprazole (PROTONIX) 40 MG EC tablet, Take 1 tablet by mouth Daily. (Patient not taking: Reported on 8/14/2024), Disp: , Rfl:     Review of Systems   Constitutional:  Negative for activity change, appetite change, chills, diaphoresis, fatigue and fever.   HENT:  Negative for congestion, drooling, ear discharge, ear pain, mouth sores, nosebleeds, postnasal drip, rhinorrhea, sinus pressure, sneezing and sore throat.    Eyes:  Negative for pain, discharge and visual disturbance.   Respiratory:  Negative for cough, chest tightness, shortness of breath and wheezing.    Cardiovascular:  Positive for palpitations. Negative for chest pain and leg swelling.   Gastrointestinal:  Negative for abdominal pain, constipation, diarrhea, nausea and vomiting.   Endocrine: Negative for cold intolerance, heat intolerance, polydipsia, polyphagia and polyuria.   Musculoskeletal:  Negative for arthralgias, myalgias and neck pain.   Skin:  Negative for rash and wound.   Neurological:  Negative for dizziness, syncope, speech difficulty, weakness, light-headedness and headaches.   Hematological:  Negative for adenopathy. Does not bruise/bleed easily.   Psychiatric/Behavioral:  Negative for confusion, dysphoric mood and sleep disturbance. The patient is not nervous/anxious.    All other systems reviewed and are negative.    /81 (BP Location: Left arm, Patient Position: Sitting, Cuff Size: Adult)    "Pulse 82   Resp 18   Ht 188 cm (74\")   Wt 135 kg (297 lb)   SpO2 98%   BMI 38.13 kg/m²     Objective   No Known Allergies    Physical Exam  Vitals reviewed.   Constitutional:       Appearance: Normal appearance. He is well-developed and overweight.   HENT:      Head: Normocephalic.   Eyes:      Conjunctiva/sclera: Conjunctivae normal.   Neck:      Thyroid: No thyromegaly.      Vascular: No carotid bruit or JVD.   Cardiovascular:      Rate and Rhythm: Normal rate and regular rhythm.   Pulmonary:      Effort: Pulmonary effort is normal.      Breath sounds: Normal breath sounds.   Musculoskeletal:      Cervical back: Neck supple.      Right lower leg: No edema.      Left lower leg: No edema.   Skin:     General: Skin is warm and dry.   Neurological:      Mental Status: He is alert and oriented to person, place, and time.   Psychiatric:         Attention and Perception: Attention normal.         Mood and Affect: Mood normal.         Speech: Speech normal.         Behavior: Behavior normal. Behavior is cooperative.         Cognition and Memory: Cognition normal.           ECG 12 Lead    Date/Time: 2024 1:04 PM  Performed by: Irma Marcelino APRN    Authorized by: Irma Marcelino APRN  Comparison: compared with previous ECG   Similar to previous ECG  Comparison to previous EC/10/2021, 2023  Rhythm: sinus rhythm  Rhythm comments: baseline artifact  Rate: normal  BPM: 79  QRS axis: left    Clinical impression: abnormal EKG  Comments: QT/QTc - 352/387          LABS  WBC   Date Value Ref Range Status   2021 6.1 3.4 - 10.8 x10E3/uL Final     RBC   Date Value Ref Range Status   2021 5.80 4.14 - 5.80 x10E6/uL Final     Hemoglobin   Date Value Ref Range Status   2021 16.8 13.0 - 17.7 g/dL Final     Hematocrit   Date Value Ref Range Status   2021 48.4 37.5 - 51.0 % Final     MCV   Date Value Ref Range Status   2021 83 79 - 97 fL Final     MCH   Date Value Ref Range Status "   07/23/2021 29.0 26.6 - 33.0 pg Final     MCHC   Date Value Ref Range Status   07/23/2021 34.7 31.5 - 35.7 g/dL Final     RDW   Date Value Ref Range Status   07/23/2021 12.9 11.6 - 15.4 % Final     Platelets   Date Value Ref Range Status   07/23/2021 262 150 - 450 x10E3/uL Final     Neutrophil Rel %   Date Value Ref Range Status   07/23/2021 46 Not Estab. % Final     Lymphocyte Rel %   Date Value Ref Range Status   07/23/2021 44 Not Estab. % Final     Monocyte Rel %   Date Value Ref Range Status   07/23/2021 6 Not Estab. % Final     Eosinophil Rel %   Date Value Ref Range Status   07/23/2021 2 Not Estab. % Final     Basophil Rel %   Date Value Ref Range Status   07/23/2021 2 Not Estab. % Final     Neutrophils Absolute   Date Value Ref Range Status   07/23/2021 2.8 1.4 - 7.0 x10E3/uL Final     Lymphocytes Absolute   Date Value Ref Range Status   07/23/2021 2.7 0.7 - 3.1 x10E3/uL Final     Monocytes Absolute   Date Value Ref Range Status   07/23/2021 0.4 0.1 - 0.9 x10E3/uL Final     Eosinophils Absolute   Date Value Ref Range Status   07/23/2021 0.1 0.0 - 0.4 x10E3/uL Final     Basophils Absolute   Date Value Ref Range Status   07/23/2021 0.1 0.0 - 0.2 x10E3/uL Final         Triglycerides   Date Value Ref Range Status   07/23/2021 413 (H) 0 - 149 mg/dL Final     HDL Cholesterol   Date Value Ref Range Status   07/23/2021 36 (L) >39 mg/dL Final     LDL Chol Calc (NIH)   Date Value Ref Range Status   07/23/2021 46 0 - 99 mg/dL Final     8/5/2024  CBC with Auto Diff   Result Value Ref Range   WBC 8.9 4.2 - 9.1 K/µL   RBC 5.93 4.63 - 6.08 M/µL   Hemoglobin 17.2 13.7 - 17.5 GM/DL   Hematocrit 48.1 40.1 - 51.0 %   MCV 81 79 - 92 fL   MCH 29.0 25.7 - 32.2 pg   MCHC 35.8 32.3 - 36.5 GM/DL   RDW 12.6 11.6 - 14.4 %   Platelets 272 140 - 375 K/CU MM   MPV 10.4 9.4 - 12.4 fL   Nucleated Red Blood Cell 0.0 0 - 0.2 %   % Neutros 51 34 - 68 %   % Lymphs 41 22 - 53 %   % Monos 6 5 - 12 %   % Eos 2 1 - 7 %   % Baso 1 0 - 1 %   NRBC  Absolute <0.01 0 - 0.012 K/ul   # Neutros 4.47 1.78 - 5.38 K/µL   # Lymphs 3.59 (H) 1.32 - 3.57 K/µL   # Monos 0.53 0.30 - 0.82 K/µL   # Eos 0.14 0.04 - 0.54 K/µL   # Baso 0.10 (H) 0.01 - 0.08 K/µL   Immature Granulocytes-Relative 0.20 0.01 - 0.43 %   # IG <0.03 0.00 - 0.03 K/uL   Comprehensive metabolic panel   Result Value Ref Range   Sodium 136 135 - 145 meq/L   Potassium 3.7 3.5 - 5.3 meq/L   Chloride 102 101 - 111 meq/L   CO2 28 21 - 31 meq/L   Calcium 8.8 8.5 - 10.5 mg/dL   Glucose 155 (H) 70 - 109 mg/dL   BUN 24 (H) 6 - 20 mg/dL   Creatinine 1.16 0.50 - 1.20 mg/dL   BUN/Creatinine 21   Albumin 3.9 3.2 - 5.5 g/dL   Alkaline Phosphatase 54 42 - 115 U/L   ALT 58 (H) 5 - 50 U/L   AST 27 10 - 42 U/L   Total Bilirubin 0.8 0.0 - 1.0 mg/dL   Protein, Total 7.4 6.7 - 8.2 gm/dL   Anion Gap 10 8 - 16   A/G Ratio 1.1   Globulin 3.5 g/dL   Osmolality Calc 279.1   eGFR (mL/min/1.73m2) >60 >=60 mL/min/1.73m2   High Sensitivity Troponin I   Result Value Ref Range   Troponin I High Sensitivity (pg/mL) <3.0 (L) 3 - 58.8 pg/mL   TSH   Result Value Ref Range   TSH (miU/mL) 1.800 0.400 - 4.700 miU/mL           Imaging   XR chest AP portable    Result Date: 8/6/2024  No acute cardiopulmonary process. Images reviewed, interpreted, and dictated by KIMBERLY Mcallister M.D.     CTA Neck    Result Date: 5/5/2024  No evidence of acute large vessel occlusive disease CTA CAROTID HISTORY: Symptoms of acute CVA. Left facial numbness. Left upper extremity numbness   COMPARISON: None TECHNIQUE: Thin section axial CT with IV contrast supplemented with multiplanar reconstruction under CT Angiogram protocol.   3 D reconstructions were performed on a separate workstation. NASCET criteria and technique was utilized during interpretation. FINDINGS: Aortic arch:  Arch shows no significant narrowing. Great vessel origins are widely patent. Right carotid:  No significant stenosis is seen of the cervical common or internal carotid artery. Left carotid:  No  significant stenosis is seen of the cervical common or internal carotid artery. Vertebrals: Left vertebral artery is dominant.   No significant stenosis is present.   IMPRESSION:  No evidence of cervical carotid stenosis This study was performed using dose reduction techniques to achieve radiation exposure as low as reasonably achievable (ALARA)     CT Angiogram Head    Result Date: 5/5/2024  No evidence of acute large vessel occlusive disease CTA CAROTID HISTORY: Symptoms of acute CVA. Left facial numbness. Left upper extremity numbness   COMPARISON: None TECHNIQUE: Thin section axial CT with IV contrast supplemented with multiplanar reconstruction under CT Angiogram protocol.   3 D reconstructions were performed on a separate workstation. NASCET criteria and technique was utilized during interpretation. FINDINGS: Aortic arch:  Arch shows no significant narrowing. Great vessel origins are widely patent. Right carotid:  No significant stenosis is seen of the cervical common or internal carotid artery. Left carotid:  No significant stenosis is seen of the cervical common or internal carotid artery. Vertebrals: Left vertebral artery is dominant.   No significant stenosis is present.   IMPRESSION:  No evidence of cervical carotid stenosis This study was performed using dose reduction techniques to achieve radiation exposure as low as reasonably achievable (ALARA)     CT Head Without Contrast    Result Date: 5/5/2024  Tiny areas of abnormal perfusion inferior left frontal lobe favor artifact over true ischemia. Recommend MR follow-up. This study was performed using dose reduction techniques to achieve radiation exposure as low as reasonably achievable (ALARA)     CT Head Without Contrast    Result Date: 5/5/2024  Unremarkable unenhanced CT of the brain. This study was performed using dose reduction techniques to achieve radiation exposure as low as reasonably achievable (ALARA)             Assessment & Plan   Diagnoses  and all orders for this visit:    1. Hypertension, unspecified type (Primary)  -     ECG 12 Lead  EKG reviewed and discussed, no acute changes  BP well controlled, continue on current therapy   Routine monitoring   Sodium restrictions recommend    2. Dyspnea, unspecified type  -     BNP; Future    3. Hypertriglyceridemia  -     Comprehensive Metabolic Panel; Future  -     Lipid Panel; Future  -     CK; Future  Continue on fenofibrate, labs pending  Pending his labs consider initiation of low-dose Crestor since he has had some recent intolerance to Lipitor however he can further discuss with primary.    4. Palpitations  -     Holter Monitor - 72 Hour Up To 15 Days; Future  Patient previously on Coreg and tolerated well however BP is occasionally low therefore we will start metoprolol 25 mg daily and continue to monitor symptoms  Holter monitor with results pending  Reviewed ER record  Closely monitor BP and heart rate with hold parameters    5.  LV dysfunction   Continue lisinopril and restart metoprolol, clinically asymptomatic, labs pending.  Patient is currently on Jardiance, will plan to continue    6.  Type 2 diabetes mellitus  Recommend tight glycemic control for overall health benefit    7.  Multiple risk factors for coronary artery disease  Ischemic evaluation will be arranged      Other orders  -     metoprolol succinate XL (TOPROL-XL) 25 MG 24 hr tablet; Take 1 tablet by mouth Daily.  Dispense: 30 tablet; Refill: 3          Review of medical record    Lifestyle modifications including heart healthy diet, regular exercise, maintenance of desirable body weight and avoidance of tobacco products    Follow-up in 6 weeks, sooner if needed.

## 2024-08-14 NOTE — LETTER
August 14, 2024     Tracie Rizvi MD  [address] [facility] [address]  [facility] [address]    Patient: Cezar Burden   YOB: 1990   Date of Visit: 8/14/2024       Dear Tracie Rizvi MD    Cezar Burden was in my office today. Below is a copy of my note.    If you have questions, please do not hesitate to call me. I look forward to following Cezar along with you.         Sincerely,        Irma Marcelino, DALE        CC: No Recipients    Subjective    Cezar Burden is a 34 y.o. male.   Chief Complaint   Patient presents with   • LV dysfunction     1 year fu       History of Present Illness   Cezar Burden is a 34-year-old male who presents to clinic today for cardiology follow-up.  He has not been seen in about a year.  He states he has been dealing with illness with his father but his father is doing better.  He plans to focus more on his health.     LV dysfunction 46 to 50% from echocardiogram 8/30/2021.  Echocardiogram with hypokinesis noted of the septum and borderline low LVEF at 51 to 55% from echocardiogram on 5/10/2023.  He denies dyspnea, orthopnea, or swelling.  He was started on Coreg at last visit and reports tolerating it well but he ran out and did not have medicine refilled.      Hypertension currently on lisinopril - HCTZ 20/25 daily. He reports taking it at night. Home monitoring with normal readings.  He reports he occasionally misses a dose as his blood pressure is low prior to medication.  He has tried to restrict sodium intake.     Hyperlipidemia was previously on Lipitor 40 mg nightly and Tricor 145 mg daily. He reports he is due for labs for his PCP.  He reports compliance with fenofibrate but reports his Lipitor was causing myalgias therefore he has discontinued and plans to talk to his primary about other treatment options. He states he has recently made some lifestyle changes trying to improve his dietary intake and overall health.      He is a former smoker and does vape  currently. History significant for DM, currently on oral medications, managed by PCP.      He states he was at [facility] for palpitations recently and was advised to follow-up with cardiology.  He denies dizziness or syncope.  He notices at times his heart beat is up while sitting and he is more sensitive to his heartbeat and can feel it easier.  Recent labs without thyroid disease or anemia    Patient Active Problem List   Diagnosis   • Murmur   • HTN (hypertension)   • HLD (hyperlipidemia)   • Diabetes   • Acid reflux   • Hypertriglyceridemia   • Anxiety   • Current every day nicotine vaping   • Morbid obesity with BMI of 40.0-44.9, adult   • Pancreatitis   • Gout   • Fatigue     Past Medical History:   Diagnosis Date   • Acid reflux     daily omeprazole controls symptoms well, no past EGD.    • Anxiety    • Current every day nicotine vaping    • Diabetes 2014   • Fatigue    • Gout     last flare approx 2016, no issues since   • HLD (hyperlipidemia)    • HTN (hypertension)    • Hypertriglyceridemia    • Morbid obesity with BMI of 40.0-44.9, adult    • Murmur     childhood   • Pancreatitis 2020    hypertriglyceridemia, no procedures.      Past Surgical History:   Procedure Laterality Date   • COLONOSCOPY     • ENDOSCOPY         Family History   Problem Relation Age of Onset   • Obesity Mother    • Diabetes Mother    • Hypertension Mother    • Sleep apnea Mother    • Bipolar disorder Mother    • Obesity Father    • Hypertension Father    • Skin cancer Father    • Diabetes Maternal Grandmother    • Hypertension Maternal Grandmother    • Cancer Maternal Grandmother         skin     Social History     Tobacco Use   • Smoking status: Former   • Smokeless tobacco: Never   • Tobacco comments:     vapes   Vaping Use   • Vaping status: Every Day   • Substances: Nicotine, Flavoring   • Devices: Pre-filled or refillable cartridge, Refillable tank   Substance Use Topics   • Alcohol use: Not Currently   • Drug use:  Never         The following portions of the patient's history were reviewed and updated as appropriate: allergies, current medications, past family history, past medical history, past social history, past surgical history and problem list.    No Known Allergies      Current Outpatient Medications:   •  fenofibrate (TRICOR) 145 MG tablet, Take 1 tablet by mouth Daily., Disp: 90 tablet, Rfl: 1  •  glimepiride (AMARYL) 4 MG tablet, Take 1 tablet by mouth Daily., Disp: , Rfl:   •  Jardiance 10 MG tablet tablet, 1 tablet., Disp: , Rfl:   •  lisinopril-hydrochlorothiazide (PRINZIDE,ZESTORETIC) 20-25 MG per tablet, Take 1 tablet by mouth Daily. for blood pressure., Disp: , Rfl:   •  metFORMIN (GLUCOPHAGE) 1000 MG tablet, Take 1 tablet by mouth 2 (two) times a day., Disp: , Rfl:   •  vitamin D (ERGOCALCIFEROL) 1.25 MG (90705 UT) capsule capsule, TAKE ONE CAPSULE BY MOUTH ONCE EVERY WEEK FOR VITAMIN D DEFICIENCY, Disp: , Rfl:   •  metoprolol succinate XL (TOPROL-XL) 25 MG 24 hr tablet, Take 1 tablet by mouth Daily., Disp: 30 tablet, Rfl: 3  •  pantoprazole (PROTONIX) 40 MG EC tablet, Take 1 tablet by mouth Daily. (Patient not taking: Reported on 8/14/2024), Disp: , Rfl:     Review of Systems   Constitutional:  Negative for activity change, appetite change, chills, diaphoresis, fatigue and fever.   HENT:  Negative for congestion, drooling, ear discharge, ear pain, mouth sores, nosebleeds, postnasal drip, rhinorrhea, sinus pressure, sneezing and sore throat.    Eyes:  Negative for pain, discharge and visual disturbance.   Respiratory:  Negative for cough, chest tightness, shortness of breath and wheezing.    Cardiovascular:  Positive for palpitations. Negative for chest pain and leg swelling.   Gastrointestinal:  Negative for abdominal pain, constipation, diarrhea, nausea and vomiting.   Endocrine: Negative for cold intolerance, heat intolerance, polydipsia, polyphagia and polyuria.   Musculoskeletal:  Negative for  "arthralgias, myalgias and neck pain.   Skin:  Negative for rash and wound.   Neurological:  Negative for dizziness, syncope, speech difficulty, weakness, light-headedness and headaches.   Hematological:  Negative for adenopathy. Does not bruise/bleed easily.   Psychiatric/Behavioral:  Negative for confusion, dysphoric mood and sleep disturbance. The patient is not nervous/anxious.    All other systems reviewed and are negative.    /81 (BP Location: Left arm, Patient Position: Sitting, Cuff Size: Adult)   Pulse 82   Resp 18   Ht 188 cm (74\")   Wt 135 kg (297 lb)   SpO2 98%   BMI 38.13 kg/m²     Objective  No Known Allergies    Physical Exam  Vitals reviewed.   Constitutional:       Appearance: Normal appearance. He is well-developed and overweight.   HENT:      Head: Normocephalic.   Eyes:      Conjunctiva/sclera: Conjunctivae normal.   Neck:      Thyroid: No thyromegaly.      Vascular: No carotid bruit or JVD.   Cardiovascular:      Rate and Rhythm: Normal rate and regular rhythm.   Pulmonary:      Effort: Pulmonary effort is normal.      Breath sounds: Normal breath sounds.   Musculoskeletal:      Cervical back: Neck supple.      Right lower leg: No edema.      Left lower leg: No edema.   Skin:     General: Skin is warm and dry.   Neurological:      Mental Status: He is alert and oriented to person, place, and time.   Psychiatric:         Attention and Perception: Attention normal.         Mood and Affect: Mood normal.         Speech: Speech normal.         Behavior: Behavior normal. Behavior is cooperative.         Cognition and Memory: Cognition normal.           ECG 12 Lead    Date/Time: 2024 1:04 PM  Performed by: Irma Marcelino APRN    Authorized by: Irma Marcelino APRN  Comparison: compared with previous ECG   Similar to previous ECG  Comparison to previous EC/10/2021, 2023  Rhythm: sinus rhythm  Rhythm comments: baseline artifact  Rate: normal  BPM: 79  QRS axis: " left    Clinical impression: abnormal EKG  Comments: QT/QTc - 352/387          LABS  WBC   Date Value Ref Range Status   07/23/2021 6.1 3.4 - 10.8 x10E3/uL Final     RBC   Date Value Ref Range Status   07/23/2021 5.80 4.14 - 5.80 x10E6/uL Final     Hemoglobin   Date Value Ref Range Status   07/23/2021 16.8 13.0 - 17.7 g/dL Final     Hematocrit   Date Value Ref Range Status   07/23/2021 48.4 37.5 - 51.0 % Final     MCV   Date Value Ref Range Status   07/23/2021 83 79 - 97 fL Final     MCH   Date Value Ref Range Status   07/23/2021 29.0 26.6 - 33.0 pg Final     MCHC   Date Value Ref Range Status   07/23/2021 34.7 31.5 - 35.7 g/dL Final     RDW   Date Value Ref Range Status   07/23/2021 12.9 11.6 - 15.4 % Final     Platelets   Date Value Ref Range Status   07/23/2021 262 150 - 450 x10E3/uL Final     Neutrophil Rel %   Date Value Ref Range Status   07/23/2021 46 Not Estab. % Final     Lymphocyte Rel %   Date Value Ref Range Status   07/23/2021 44 Not Estab. % Final     Monocyte Rel %   Date Value Ref Range Status   07/23/2021 6 Not Estab. % Final     Eosinophil Rel %   Date Value Ref Range Status   07/23/2021 2 Not Estab. % Final     Basophil Rel %   Date Value Ref Range Status   07/23/2021 2 Not Estab. % Final     Neutrophils Absolute   Date Value Ref Range Status   07/23/2021 2.8 1.4 - 7.0 x10E3/uL Final     Lymphocytes Absolute   Date Value Ref Range Status   07/23/2021 2.7 0.7 - 3.1 x10E3/uL Final     Monocytes Absolute   Date Value Ref Range Status   07/23/2021 0.4 0.1 - 0.9 x10E3/uL Final     Eosinophils Absolute   Date Value Ref Range Status   07/23/2021 0.1 0.0 - 0.4 x10E3/uL Final     Basophils Absolute   Date Value Ref Range Status   07/23/2021 0.1 0.0 - 0.2 x10E3/uL Final         Triglycerides   Date Value Ref Range Status   07/23/2021 413 (H) 0 - 149 mg/dL Final     HDL Cholesterol   Date Value Ref Range Status   07/23/2021 36 (L) >39 mg/dL Final     LDL Chol Calc ([facility])   Date Value Ref Range Status    07/23/2021 46 0 - 99 mg/dL Final     8/5/2024  CBC with Auto Diff   Result Value Ref Range   WBC 8.9 4.2 - 9.1 K/µL   RBC 5.93 4.63 - 6.08 M/µL   Hemoglobin 17.2 13.7 - 17.5 GM/DL   Hematocrit 48.1 40.1 - 51.0 %   MCV 81 79 - 92 fL   MCH 29.0 25.7 - 32.2 pg   MCHC 35.8 32.3 - 36.5 GM/DL   RDW 12.6 11.6 - 14.4 %   Platelets 272 140 - 375 K/CU MM   MPV 10.4 9.4 - 12.4 fL   Nucleated Red Blood Cell 0.0 0 - 0.2 %   % Neutros 51 34 - 68 %   % Lymphs 41 22 - 53 %   % Monos 6 5 - 12 %   % Eos 2 1 - 7 %   % Baso 1 0 - 1 %   NRBC Absolute <0.01 0 - 0.012 K/ul   # Neutros 4.47 1.78 - 5.38 K/µL   # Lymphs 3.59 (H) 1.32 - 3.57 K/µL   # Monos 0.53 0.30 - 0.82 K/µL   # Eos 0.14 0.04 - 0.54 K/µL   # Baso 0.10 (H) 0.01 - 0.08 K/µL   Immature Granulocytes-Relative 0.20 0.01 - 0.43 %   # IG <0.03 0.00 - 0.03 K/uL   Comprehensive metabolic panel   Result Value Ref Range   Sodium 136 135 - 145 meq/L   Potassium 3.7 3.5 - 5.3 meq/L   Chloride 102 101 - 111 meq/L   CO2 28 21 - 31 meq/L   Calcium 8.8 8.5 - 10.5 mg/dL   Glucose 155 (H) 70 - 109 mg/dL   BUN 24 (H) 6 - 20 mg/dL   Creatinine 1.16 0.50 - 1.20 mg/dL   BUN/Creatinine 21   Albumin 3.9 3.2 - 5.5 g/dL   Alkaline Phosphatase 54 42 - 115 U/L   ALT 58 (H) 5 - 50 U/L   AST 27 10 - 42 U/L   Total Bilirubin 0.8 0.0 - 1.0 mg/dL   Protein, Total 7.4 6.7 - 8.2 gm/dL   Anion Gap 10 8 - 16   A/G Ratio 1.1   Globulin 3.5 g/dL   Osmolality Calc 279.1   eGFR (mL/min/1.73m2) >60 >=60 mL/min/1.73m2   High Sensitivity Troponin I   Result Value Ref Range   Troponin I High Sensitivity (pg/mL) <3.0 (L) 3 - 58.8 pg/mL   TSH   Result Value Ref Range   TSH (miU/mL) 1.800 0.400 - 4.700 miU/mL           Imaging   XR chest AP portable    Result Date: 8/6/2024  No acute cardiopulmonary process. Images reviewed, interpreted, and dictated by KIMBERLY Mcallister M.D.     CTA Neck    Result Date: 5/5/2024  No evidence of acute large vessel occlusive disease CTA CAROTID HISTORY: Symptoms of acute CVA. Left facial  numbness. Left upper extremity numbness   COMPARISON: None TECHNIQUE: Thin section axial CT with IV contrast supplemented with multiplanar reconstruction under CT Angiogram protocol.   3 D reconstructions were performed on a separate workstation. NASCET criteria and technique was utilized during interpretation. FINDINGS: Aortic arch:  Arch shows no significant narrowing. Great vessel origins are widely patent. Right carotid:  No significant stenosis is seen of the cervical common or internal carotid artery. Left carotid:  No significant stenosis is seen of the cervical common or internal carotid artery. Vertebrals: Left vertebral artery is dominant.   No significant stenosis is present.   IMPRESSION:  No evidence of cervical carotid stenosis This study was performed using dose reduction techniques to achieve radiation exposure as low as reasonably achievable (ALARA)     CT Angiogram Head    Result Date: 5/5/2024  No evidence of acute large vessel occlusive disease CTA CAROTID HISTORY: Symptoms of acute CVA. Left facial numbness. Left upper extremity numbness   COMPARISON: None TECHNIQUE: Thin section axial CT with IV contrast supplemented with multiplanar reconstruction under CT Angiogram protocol.   3 D reconstructions were performed on a separate workstation. NASCET criteria and technique was utilized during interpretation. FINDINGS: Aortic arch:  Arch shows no significant narrowing. Great vessel origins are widely patent. Right carotid:  No significant stenosis is seen of the cervical common or internal carotid artery. Left carotid:  No significant stenosis is seen of the cervical common or internal carotid artery. Vertebrals: Left vertebral artery is dominant.   No significant stenosis is present.   IMPRESSION:  No evidence of cervical carotid stenosis This study was performed using dose reduction techniques to achieve radiation exposure as low as reasonably achievable (ALARA)     CT Head Without  Contrast    Result Date: 5/5/2024  Tiny areas of abnormal perfusion inferior left frontal lobe favor artifact over true ischemia. Recommend MR follow-up. This study was performed using dose reduction techniques to achieve radiation exposure as low as reasonably achievable (ALARA)     CT Head Without Contrast    Result Date: 5/5/2024  Unremarkable unenhanced CT of the brain. This study was performed using dose reduction techniques to achieve radiation exposure as low as reasonably achievable (ALARA)             Assessment & Plan  Diagnoses and all orders for this visit:    1. Hypertension, unspecified type (Primary)  -     ECG 12 Lead  EKG reviewed and discussed, no acute changes  BP well controlled, continue on current therapy   Routine monitoring   Sodium restrictions recommend    2. Dyspnea, unspecified type  -     BNP; Future    3. Hypertriglyceridemia  -     Comprehensive Metabolic Panel; Future  -     Lipid Panel; Future  -     CK; Future  Continue on fenofibrate, labs pending  Pending his labs consider initiation of low-dose Crestor since he has had some recent intolerance to Lipitor however he can further discuss with primary.    4. Palpitations  -     Holter Monitor - 72 Hour Up To 15 Days; Future  Patient previously on Coreg and tolerated well however BP is occasionally low therefore we will start metoprolol 25 mg daily and continue to monitor symptoms  Holter monitor with results pending  Reviewed ER record  Closely monitor BP and heart rate with hold parameters    5.  LV dysfunction   Continue lisinopril and restart metoprolol, clinically asymptomatic, labs pending.  Patient is currently on Jardiance, will plan to continue    6.  Type 2 diabetes mellitus  Recommend tight glycemic control for overall health benefit    7.  Multiple risk factors for coronary artery disease  Ischemic evaluation will be arranged      Other orders  -     metoprolol succinate XL (TOPROL-XL) 25 MG 24 hr tablet; Take 1 tablet by  mouth Daily.  Dispense: 30 tablet; Refill: 3          Review of medical record    Lifestyle modifications including heart healthy diet, regular exercise, maintenance of desirable body weight and avoidance of tobacco products    Follow-up in 6 weeks, sooner if needed.

## 2024-08-28 ENCOUNTER — TELEPHONE (OUTPATIENT)
Dept: CARDIOLOGY | Facility: CLINIC | Age: 34
End: 2024-08-28
Payer: MEDICAID

## 2024-08-28 NOTE — TELEPHONE ENCOUNTER
Called pt, no answer.  Full  ----- Message from Irma Marcelino sent at 8/27/2024  3:47 PM EDT -----  Holter monitor was normal, continue with current plan of care

## 2024-09-25 ENCOUNTER — OFFICE VISIT (OUTPATIENT)
Dept: CARDIOLOGY | Facility: CLINIC | Age: 34
End: 2024-09-25
Payer: MEDICAID

## 2024-09-25 VITALS
SYSTOLIC BLOOD PRESSURE: 130 MMHG | OXYGEN SATURATION: 96 % | DIASTOLIC BLOOD PRESSURE: 86 MMHG | HEIGHT: 74 IN | WEIGHT: 293 LBS | HEART RATE: 76 BPM | BODY MASS INDEX: 37.6 KG/M2

## 2024-09-25 DIAGNOSIS — R00.2 PALPITATIONS: ICD-10-CM

## 2024-09-25 DIAGNOSIS — R07.9 CHEST PAIN, UNSPECIFIED TYPE: Primary | ICD-10-CM

## 2024-09-25 DIAGNOSIS — E78.5 HYPERLIPIDEMIA LDL GOAL <70: ICD-10-CM

## 2024-09-25 DIAGNOSIS — Z91.89 MULTIPLE RISK FACTORS FOR CORONARY ARTERY DISEASE: ICD-10-CM

## 2024-09-25 DIAGNOSIS — I51.9 LV DYSFUNCTION: ICD-10-CM

## 2024-09-25 DIAGNOSIS — I10 HYPERTENSION, UNSPECIFIED TYPE: ICD-10-CM

## 2024-09-25 DIAGNOSIS — E11.9 TYPE 2 DIABETES MELLITUS WITHOUT COMPLICATION, WITHOUT LONG-TERM CURRENT USE OF INSULIN: ICD-10-CM

## 2024-09-25 RX ORDER — METOPROLOL SUCCINATE 50 MG/1
50 TABLET, EXTENDED RELEASE ORAL DAILY
Qty: 90 TABLET | Refills: 1 | Status: SHIPPED | OUTPATIENT
Start: 2024-09-25

## 2024-09-25 RX ORDER — ROSUVASTATIN CALCIUM 5 MG/1
5 TABLET, COATED ORAL DAILY
Qty: 90 TABLET | Refills: 1 | Status: SHIPPED | OUTPATIENT
Start: 2024-09-25

## 2024-09-25 RX ORDER — FENOFIBRATE 145 MG/1
145 TABLET, COATED ORAL DAILY
Qty: 90 TABLET | Refills: 1 | Status: SHIPPED | OUTPATIENT
Start: 2024-09-25

## 2024-09-25 NOTE — PROGRESS NOTES
Subjective     Cezar Burden is a 34 y.o. male.   Chief Complaint   Patient presents with    LV Dysfunction    History of Present Illness   Cezar Burden is a 34-year-old male who presents to clinic today for cardiology follow-up.    LV dysfunction 46 to 50% from echocardiogram 8/30/2021.  Echocardiogram with hypokinesis noted of the septum and borderline low LVEF at 51 to 55% from echocardiogram on 5/10/2023.  He denies dyspnea, orthopnea, or swelling.  He was started on Coreg but changed to metoprolol due to soft BP.  He reports tolerating medication well. He reports experiencing occasional chest discomfort.  His last nuclear stress test was in 2023 with a normal myocardial perfusion study and no evidence of ischemia.    Hypertension currently on lisinopril - HCTZ 20/25 daily and Metoprolol daily. Home monitoring with normal readings.  He reports he occasionally misses a dose as his blood pressure is low prior to medication.  He has tried to restrict sodium intake.      Hyperlipidemia was previously on Lipitor 40 mg nightly and Tricor 145 mg daily. Due to side effects he changed to Crestor.  He reports tolerating well.  He will be due for upcoming labs in the near future.  He continues on fenofibrate.  He denies myalgias with Crestor.      DM, currently on oral medications, managed by PCP.       Palpitations are stable/improved on current dose of medication. He denies worsening palpitations, bradycardia, tachycardia, dizziness or syncope. He notices at times his heart beat is up while sitting and he is more sensitive to his heartbeat and can feel it easier.  Recent labs without thyroid disease or anemia    Patient Active Problem List   Diagnosis    Murmur    HTN (hypertension)    HLD (hyperlipidemia)    Diabetes    Acid reflux    Hypertriglyceridemia    Anxiety    Current every day nicotine vaping    Morbid obesity with BMI of 40.0-44.9, adult    Pancreatitis    Gout    Fatigue    Palpitations    LV dysfunction     Multiple risk factors for coronary artery disease     Past Medical History:   Diagnosis Date    Acid reflux     daily omeprazole controls symptoms well, no past EGD.     Anxiety     Current every day nicotine vaping     Diabetes 2014    Fatigue     Gout     last flare approx 2016, no issues since    HLD (hyperlipidemia)     HTN (hypertension)     Hypertriglyceridemia     Morbid obesity with BMI of 40.0-44.9, adult     Murmur     childhood    Pancreatitis 2020    hypertriglyceridemia, no procedures.      Past Surgical History:   Procedure Laterality Date    COLONOSCOPY      ENDOSCOPY         Family History   Problem Relation Age of Onset    Obesity Mother     Diabetes Mother     Hypertension Mother     Sleep apnea Mother     Bipolar disorder Mother     Obesity Father     Hypertension Father     Skin cancer Father     Diabetes Maternal Grandmother     Hypertension Maternal Grandmother     Cancer Maternal Grandmother         skin     Social History     Tobacco Use    Smoking status: Former     Passive exposure: Never    Smokeless tobacco: Never    Tobacco comments:     vapes   Vaping Use    Vaping status: Every Day    Substances: Nicotine, Flavoring    Devices: Pre-filled or refillable cartridge, Refillable tank   Substance Use Topics    Alcohol use: Not Currently    Drug use: Never         The following portions of the patient's history were reviewed and updated as appropriate: allergies, current medications, past family history, past medical history, past social history, past surgical history and problem list.    No Known Allergies      Current Outpatient Medications:     fenofibrate (TRICOR) 145 MG tablet, Take 1 tablet by mouth Daily., Disp: 90 tablet, Rfl: 1    glimepiride (AMARYL) 4 MG tablet, Take 1 tablet by mouth Daily., Disp: , Rfl:     Jardiance 10 MG tablet tablet, 1 tablet., Disp: , Rfl:     lisinopril-hydrochlorothiazide (PRINZIDE,ZESTORETIC) 20-25 MG per tablet, Take 1 tablet by mouth Daily. for blood  "pressure., Disp: , Rfl:     metFORMIN (GLUCOPHAGE) 1000 MG tablet, Take 1 tablet by mouth 2 (two) times a day., Disp: , Rfl:     metoprolol succinate XL (TOPROL-XL) 50 MG 24 hr tablet, Take 1 tablet by mouth Daily., Disp: 90 tablet, Rfl: 1    vitamin D (ERGOCALCIFEROL) 1.25 MG (48577 UT) capsule capsule, TAKE ONE CAPSULE BY MOUTH ONCE EVERY WEEK FOR VITAMIN D DEFICIENCY, Disp: , Rfl:     pantoprazole (PROTONIX) 40 MG EC tablet, Take 1 tablet by mouth Daily. (Patient not taking: Reported on 8/14/2024), Disp: , Rfl:     rosuvastatin (CRESTOR) 5 MG tablet, Take 1 tablet by mouth Daily., Disp: 90 tablet, Rfl: 1    Review of Systems   Constitutional:  Negative for activity change, appetite change, chills, diaphoresis, fatigue and fever.   HENT:  Negative for congestion, drooling, ear discharge, ear pain, mouth sores, nosebleeds, postnasal drip, rhinorrhea, sinus pressure, sneezing and sore throat.    Eyes:  Negative for pain, discharge and visual disturbance.   Respiratory:  Negative for cough, chest tightness, shortness of breath and wheezing.    Cardiovascular:  Positive for chest pain. Negative for palpitations and leg swelling.   Gastrointestinal:  Negative for abdominal pain, constipation, diarrhea, nausea and vomiting.   Endocrine: Negative for cold intolerance, heat intolerance, polydipsia, polyphagia and polyuria.   Musculoskeletal:  Negative for arthralgias, myalgias and neck pain.   Skin:  Negative for rash and wound.   Neurological:  Negative for dizziness, syncope, speech difficulty, weakness, light-headedness and headaches.   Hematological:  Negative for adenopathy. Does not bruise/bleed easily.   Psychiatric/Behavioral:  Negative for confusion, dysphoric mood and sleep disturbance. The patient is not nervous/anxious.    All other systems reviewed and are negative.    /86 (BP Location: Left arm, Patient Position: Sitting, Cuff Size: Adult)   Pulse 76   Ht 188 cm (74\")   Wt 133 kg (293 lb)   SpO2 " 96%   BMI 37.62 kg/m²     Objective   No Known Allergies    Physical Exam  Vitals reviewed.   Constitutional:       Appearance: Normal appearance. He is well-developed. He is obese.   HENT:      Head: Normocephalic.   Eyes:      Conjunctiva/sclera: Conjunctivae normal.   Neck:      Thyroid: No thyromegaly.      Vascular: No carotid bruit or JVD.   Cardiovascular:      Rate and Rhythm: Normal rate and regular rhythm.   Pulmonary:      Effort: Pulmonary effort is normal.      Breath sounds: Normal breath sounds.   Abdominal:      General: Bowel sounds are normal.      Palpations: Abdomen is soft. There is no hepatomegaly or splenomegaly.      Tenderness: There is no abdominal tenderness.   Musculoskeletal:      Cervical back: Neck supple.      Right lower leg: No edema.      Left lower leg: No edema.   Skin:     General: Skin is warm and dry.   Neurological:      Mental Status: He is alert and oriented to person, place, and time.   Psychiatric:         Attention and Perception: Attention normal.         Mood and Affect: Mood normal.         Speech: Speech normal.         Behavior: Behavior normal. Behavior is cooperative.         Cognition and Memory: Cognition normal.       LABS  WBC   Date Value Ref Range Status   07/23/2021 6.1 3.4 - 10.8 x10E3/uL Final     RBC   Date Value Ref Range Status   07/23/2021 5.80 4.14 - 5.80 x10E6/uL Final     Hemoglobin   Date Value Ref Range Status   07/23/2021 16.8 13.0 - 17.7 g/dL Final     Hematocrit   Date Value Ref Range Status   07/23/2021 48.4 37.5 - 51.0 % Final     MCV   Date Value Ref Range Status   07/23/2021 83 79 - 97 fL Final     MCH   Date Value Ref Range Status   07/23/2021 29.0 26.6 - 33.0 pg Final     MCHC   Date Value Ref Range Status   07/23/2021 34.7 31.5 - 35.7 g/dL Final     RDW   Date Value Ref Range Status   07/23/2021 12.9 11.6 - 15.4 % Final     Platelets   Date Value Ref Range Status   07/23/2021 262 150 - 450 x10E3/uL Final     Neutrophil Rel %   Date  Value Ref Range Status   07/23/2021 46 Not Estab. % Final     Lymphocyte Rel %   Date Value Ref Range Status   07/23/2021 44 Not Estab. % Final     Monocyte Rel %   Date Value Ref Range Status   07/23/2021 6 Not Estab. % Final     Eosinophil Rel %   Date Value Ref Range Status   07/23/2021 2 Not Estab. % Final     Basophil Rel %   Date Value Ref Range Status   07/23/2021 2 Not Estab. % Final     Neutrophils Absolute   Date Value Ref Range Status   07/23/2021 2.8 1.4 - 7.0 x10E3/uL Final     Lymphocytes Absolute   Date Value Ref Range Status   07/23/2021 2.7 0.7 - 3.1 x10E3/uL Final     Monocytes Absolute   Date Value Ref Range Status   07/23/2021 0.4 0.1 - 0.9 x10E3/uL Final     Eosinophils Absolute   Date Value Ref Range Status   07/23/2021 0.1 0.0 - 0.4 x10E3/uL Final     Basophils Absolute   Date Value Ref Range Status   07/23/2021 0.1 0.0 - 0.2 x10E3/uL Final       Triglycerides   Date Value Ref Range Status   07/23/2021 413 (H) 0 - 149 mg/dL Final     HDL Cholesterol   Date Value Ref Range Status   07/23/2021 36 (L) >39 mg/dL Final     LDL Chol Calc (NIH)   Date Value Ref Range Status   07/23/2021 46 0 - 99 mg/dL Final     IMAGING  XR chest AP portable    Result Date: 8/6/2024  No acute cardiopulmonary process. Images reviewed, interpreted, and dictated by KIMBERLY Mcallister M.D.     Holter monitor 8/26/2024  Interpretation Summary       A normal monitor study.    The patient was monitored for 3 days 14 hours and 15 minutes.    The predominant rhythm noted during the testing period was sinus rhythm.    Average HR: 84. Min HR: 53. Max HR: 132.    No significant arrhythmia detected.    Patient recorded 2 events, no EKG correlation detected.             Assessment & Plan   Diagnoses and all orders for this visit:    1. Chest pain, unspecified type (Primary)  -     Stress Test With Myocardial Perfusion (1 Day); Future  Further evaluation will be arranged    2. LV dysfunction  Continue on lisinopril/HCTZ, Jardiance and  Toprol-XL.    3. Palpitations  Continue on metoprolol  Recommend avoidance of caffeine/stimulants  Reviewed Holter monitor    4. Hypertension, unspecified type  controlled, continue current therapy    5. Hyperlipidemia LDL goal <70  Continue on statin, heart healthy diet, LDL goal less than 70    6. Multiple risk factors for coronary artery disease  Aggressive risk factor modification    7. Type 2 diabetes mellitus without complication, without long-term current use of insulin  Recommend tight glycemic control for overall health benefit    Other orders  -     metoprolol succinate XL (TOPROL-XL) 50 MG 24 hr tablet; Take 1 tablet by mouth Daily.  Dispense: 90 tablet; Refill: 1  -     fenofibrate (TRICOR) 145 MG tablet; Take 1 tablet by mouth Daily.  Dispense: 90 tablet; Refill: 1  -     rosuvastatin (CRESTOR) 5 MG tablet; Take 1 tablet by mouth Daily.  Dispense: 90 tablet; Refill: 1      Review of medical record including Holter monitor    Lifestyle modifications including heart healthy diet, regular exercise, maintenance of desirable body weight and avoidance of tobacco products    Follow-up in 2 months to discuss and review testing results, sooner if needed.

## 2024-09-25 NOTE — Clinical Note
October 12, 2024     Tracie Rizvi MD  [address] [address]    Patient: Cezar Burden   YOB: 1990   Date of Visit: 9/25/2024       Dear Tracie Rizvi MD    Cezar Burden was in my office today. Below is a copy of my note.    If you have questions, please do not hesitate to call me. I look forward to following Cezar along with you.         Sincerely,        DALE Bates        CC: Asmita Mejias PA-C    Subjective    Cezar Burden is a 34 y.o. male.   Chief Complaint   Patient presents with    LV Dysfunction    History of Present Illness   Cezar Burden is a 34-year-old male who presents to clinic today for cardiology follow-up.    LV dysfunction 46 to 50% from echocardiogram 8/30/2021.  Echocardiogram with hypokinesis noted of the septum and borderline low LVEF at 51 to 55% from echocardiogram on 5/10/2023.  He denies dyspnea, orthopnea, or swelling.  He was started on Coreg at last visit and reports tolerating it well but he ran out and did not have medicine refilled.       Hypertension currently on lisinopril - HCTZ 20/25 daily. He reports taking it at night. Home monitoring with normal readings.  He reports he occasionally misses a dose as his blood pressure is low prior to medication.  He has tried to restrict sodium intake.      Hyperlipidemia was previously on Lipitor 40 mg nightly and Tricor 145 mg daily. He reports he is due for labs for his PCP.  He reports compliance with fenofibrate but reports his Lipitor was causing myalgias therefore he has discontinued and plans to talk to his primary about other treatment options. He states he has recently made some lifestyle changes trying to improve his dietary intake and overall health.      He is a former smoker and does vape currently. History significant for DM, currently on oral medications, managed by PCP.       He states he was at [facility] for palpitations recently and was advised  to follow-up with cardiology.  He denies dizziness or syncope.  He notices at times his heart beat is up while sitting and he is more sensitive to his heartbeat and can feel it easier.  Recent labs without thyroid disease or anemia    Patient Active Problem List   Diagnosis    Murmur    HTN (hypertension)    HLD (hyperlipidemia)    Diabetes    Acid reflux    Hypertriglyceridemia    Anxiety    Current every day nicotine vaping    Morbid obesity with BMI of 40.0-44.9, adult    Pancreatitis    Gout    Fatigue    Palpitations    LV dysfunction    Multiple risk factors for coronary artery disease     Past Medical History:   Diagnosis Date    Acid reflux     daily omeprazole controls symptoms well, no past EGD.     Anxiety     Current every day nicotine vaping     Diabetes 2014    Fatigue     Gout     last flare approx 2016, no issues since    HLD (hyperlipidemia)     HTN (hypertension)     Hypertriglyceridemia     Morbid obesity with BMI of 40.0-44.9, adult     Murmur     childhood    Pancreatitis 2020    hypertriglyceridemia, no procedures.      Past Surgical History:   Procedure Laterality Date    COLONOSCOPY      ENDOSCOPY         Family History   Problem Relation Age of Onset    Obesity Mother     Diabetes Mother     Hypertension Mother     Sleep apnea Mother     Bipolar disorder Mother     Obesity Father     Hypertension Father     Skin cancer Father     Diabetes Maternal Grandmother     Hypertension Maternal Grandmother     Cancer Maternal Grandmother         skin     Social History     Tobacco Use    Smoking status: Former     Passive exposure: Never    Smokeless tobacco: Never    Tobacco comments:     vapes   Vaping Use    Vaping status: Every Day    Substances: Nicotine, Flavoring    Devices: Pre-filled or refillable cartridge, Refillable tank   Substance Use Topics    Alcohol use: Not Currently    Drug use: Never         The following portions of the patient's history were reviewed and updated as appropriate:  "allergies, current medications, past family history, past medical history, past social history, past surgical history and problem list.    No Known Allergies      Current Outpatient Medications:     fenofibrate (TRICOR) 145 MG tablet, Take 1 tablet by mouth Daily., Disp: 90 tablet, Rfl: 1    glimepiride (AMARYL) 4 MG tablet, Take 1 tablet by mouth Daily., Disp: , Rfl:     Jardiance 10 MG tablet tablet, 1 tablet., Disp: , Rfl:     lisinopril-hydrochlorothiazide (PRINZIDE,ZESTORETIC) 20-25 MG per tablet, Take 1 tablet by mouth Daily. for blood pressure., Disp: , Rfl:     metFORMIN (GLUCOPHAGE) 1000 MG tablet, Take 1 tablet by mouth 2 (two) times a day., Disp: , Rfl:     metoprolol succinate XL (TOPROL-XL) 25 MG 24 hr tablet, Take 1 tablet by mouth Daily., Disp: 30 tablet, Rfl: 3    vitamin D (ERGOCALCIFEROL) 1.25 MG (48912 UT) capsule capsule, TAKE ONE CAPSULE BY MOUTH ONCE EVERY WEEK FOR VITAMIN D DEFICIENCY, Disp: , Rfl:     pantoprazole (PROTONIX) 40 MG EC tablet, Take 1 tablet by mouth Daily. (Patient not taking: Reported on 8/14/2024), Disp: , Rfl:     Review of Systems    /86 (BP Location: Left arm, Patient Position: Sitting, Cuff Size: Adult)   Pulse 76   Ht 188 cm (74\")   Wt 133 kg (293 lb)   SpO2 96%   BMI 37.62 kg/m²     Objective  No Known Allergies    Physical Exam    Procedures    [unfilled]  WBC   Date Value Ref Range Status   07/23/2021 6.1 3.4 - 10.8 x10E3/uL Final     RBC   Date Value Ref Range Status   07/23/2021 5.80 4.14 - 5.80 x10E6/uL Final     Hemoglobin   Date Value Ref Range Status   07/23/2021 16.8 13.0 - 17.7 g/dL Final     Hematocrit   Date Value Ref Range Status   07/23/2021 48.4 37.5 - 51.0 % Final     MCV   Date Value Ref Range Status   07/23/2021 83 79 - 97 fL Final     MCH   Date Value Ref Range Status   07/23/2021 29.0 26.6 - 33.0 pg Final     MCHC   Date Value Ref Range Status   07/23/2021 34.7 31.5 - 35.7 g/dL Final     RDW   Date Value Ref Range Status   07/23/2021 12.9 " 11.6 - 15.4 % Final     Platelets   Date Value Ref Range Status   07/23/2021 262 150 - 450 x10E3/uL Final     Neutrophil Rel %   Date Value Ref Range Status   07/23/2021 46 Not Estab. % Final     Lymphocyte Rel %   Date Value Ref Range Status   07/23/2021 44 Not Estab. % Final     Monocyte Rel %   Date Value Ref Range Status   07/23/2021 6 Not Estab. % Final     Eosinophil Rel %   Date Value Ref Range Status   07/23/2021 2 Not Estab. % Final     Basophil Rel %   Date Value Ref Range Status   07/23/2021 2 Not Estab. % Final     Neutrophils Absolute   Date Value Ref Range Status   07/23/2021 2.8 1.4 - 7.0 x10E3/uL Final     Lymphocytes Absolute   Date Value Ref Range Status   07/23/2021 2.7 0.7 - 3.1 x10E3/uL Final     Monocytes Absolute   Date Value Ref Range Status   07/23/2021 0.4 0.1 - 0.9 x10E3/uL Final     Eosinophils Absolute   Date Value Ref Range Status   07/23/2021 0.1 0.0 - 0.4 x10E3/uL Final     Basophils Absolute   Date Value Ref Range Status   07/23/2021 0.1 0.0 - 0.2 x10E3/uL Final   LASTLAB(GLUCOSE,NA,K,CO2,CL,ANIONGAP,CREATININE,BUN,BCR,CALCIUM,EGFRIFNONA,ALKPHOS,PROTEINTOT,ALT,AST,BILITOT,ALBUMIN,GLOB,LABIL2)@  Triglycerides   Date Value Ref Range Status   07/23/2021 413 (H) 0 - 149 mg/dL Final     HDL Cholesterol   Date Value Ref Range Status   07/23/2021 36 (L) >39 mg/dL Final     LDL Chol Calc (NIH)   Date Value Ref Range Status   07/23/2021 46 0 - 99 mg/dL Final       XR chest AP portable    Result Date: 8/6/2024  No acute cardiopulmonary process. Images reviewed, interpreted, and dictated by KIMBERLY Mcallister M.D.             Assessment & Plan  There are no diagnoses linked to this encounter.  No diagnosis found.

## 2024-11-15 ENCOUNTER — TELEPHONE (OUTPATIENT)
Dept: CARDIOLOGY | Facility: CLINIC | Age: 34
End: 2024-11-15

## 2024-11-15 RX ORDER — ROSUVASTATIN CALCIUM 5 MG/1
5 TABLET, COATED ORAL DAILY
Qty: 30 TABLET | Refills: 0 | Status: SHIPPED | OUTPATIENT
Start: 2024-11-15

## 2024-11-15 RX ORDER — FENOFIBRATE 145 MG/1
145 TABLET, COATED ORAL DAILY
Qty: 30 TABLET | Refills: 0 | Status: SHIPPED | OUTPATIENT
Start: 2024-11-15

## 2024-11-15 NOTE — TELEPHONE ENCOUNTER
Caller: Corrina Burden    Relationship: Emergency Contact    Best call back number: [phone]     What orders are you requesting (i.e. lab or imaging): STRESS     In what timeframe would the patient need to come in: N/A     Where will you receive your lab/imaging services: N/A     Additional notes: STATES ORDERS HAVE

## 2024-11-15 NOTE — TELEPHONE ENCOUNTER
Caller: Corrina Burden    Relationship: Emergency Contact    Best call back number: [phone]     Requested Prescriptions:   Requested Prescriptions     Pending Prescriptions Disp Refills    rosuvastatin (CRESTOR) 5 MG tablet 90 tablet 1     Sig: Take 1 tablet by mouth Daily.    fenofibrate (TRICOR) 145 MG tablet 90 tablet 1     Sig: Take 1 tablet by mouth Daily.        Pharmacy where request should be sent: [facility], [place] - [address] - [phone]  - [phone] FX     Last office visit with prescribing clinician: 9/25/2024   Last telemedicine visit with prescribing clinician: Visit date not found   Next office visit with prescribing clinician: 11/15/2024     Additional details provided by patient: PT IS ALSO NEEDING A REFILL ON metoprolol succinate XL (TOPROL-XL) 50 MG 24 hr tablet [02740] (Order [id number]) BUT CALLER STATES THIS IS 25MG    Does the patient have less than a 3 day supply:  [] Yes  [x] No    Would you like a call back once the refill request has been completed: [] Yes [] No    If the office needs to give you a call back, can they leave a voicemail: [] Yes [] No    Billy Lamar Rep   11/15/24 10:01 EST

## 2024-11-15 NOTE — TELEPHONE ENCOUNTER
Called and given message per AGUILA HUNTER.  Left a detailed vm stating he can call [facility] and ask for scheduling and get his stress test rescheduled.

## 2024-11-21 ENCOUNTER — OFFICE VISIT (OUTPATIENT)
Dept: CARDIOLOGY | Facility: CLINIC | Age: 34
End: 2024-11-21
Payer: MEDICAID

## 2024-11-21 VITALS
DIASTOLIC BLOOD PRESSURE: 66 MMHG | OXYGEN SATURATION: 95 % | WEIGHT: 309 LBS | HEIGHT: 74 IN | BODY MASS INDEX: 39.66 KG/M2 | HEART RATE: 71 BPM | SYSTOLIC BLOOD PRESSURE: 104 MMHG

## 2024-11-21 DIAGNOSIS — Z91.89 MULTIPLE RISK FACTORS FOR CORONARY ARTERY DISEASE: ICD-10-CM

## 2024-11-21 DIAGNOSIS — I51.9 LV DYSFUNCTION: ICD-10-CM

## 2024-11-21 DIAGNOSIS — R00.2 PALPITATIONS: ICD-10-CM

## 2024-11-21 DIAGNOSIS — E78.5 HYPERLIPIDEMIA LDL GOAL <70: ICD-10-CM

## 2024-11-21 DIAGNOSIS — I10 HYPERTENSION, UNSPECIFIED TYPE: ICD-10-CM

## 2024-11-21 DIAGNOSIS — R07.9 CHEST PAIN, UNSPECIFIED TYPE: Primary | ICD-10-CM

## 2024-11-21 RX ORDER — METOPROLOL SUCCINATE 50 MG/1
50 TABLET, EXTENDED RELEASE ORAL DAILY
Qty: 90 TABLET | Refills: 1 | Status: SHIPPED | OUTPATIENT
Start: 2024-11-21

## 2024-11-21 RX ORDER — OMEPRAZOLE 40 MG/1
40 CAPSULE, DELAYED RELEASE ORAL DAILY
COMMUNITY
Start: 2024-10-25

## 2024-11-21 RX ORDER — LISINOPRIL AND HYDROCHLOROTHIAZIDE 20; 25 MG/1; MG/1
1 TABLET ORAL DAILY
Qty: 90 TABLET | Refills: 1 | Status: SHIPPED | OUTPATIENT
Start: 2024-11-21

## 2024-11-21 NOTE — LETTER
December 6, 2024     Tracie Rizvi MD  [address] [facility] [address]  [facility] [address]    Patient: Cezar Burden   YOB: 1990   Date of Visit: 11/21/2024       Dear Tracie Rizvi MD    Cezar Burden was in my office today. Below is a copy of my note.    If you have questions, please do not hesitate to call me. I look forward to following Cezar along with you.         Sincerely,        Irma Marcelino, DALE        CC: No Recipients    Subjective    Cezar Burden is a 34 y.o. male.   Chief Complaint   Patient presents with   • Med Refill     PENDING  NEED TO REORDER STRESS TEST      History of Present Illness   Cezar Burden is a 34-year-old male who presents to clinic today for cardiology follow-up.  He feels he is overall doing well and denies any acute complaint.    LV dysfunction 46 to 50% from echocardiogram 8/30/2021.  Echocardiogram with hypokinesis noted of the septum and borderline low LVEF at 51 to 55% from echocardiogram on 5/10/2023.  He denies dyspnea, orthopnea, or swelling.  He was started on Coreg but changed to metoprolol due to soft BP.  He reports tolerating medication well. He reports experiencing occasional chest discomfort.  His last nuclear stress test was in 2023 with a normal myocardial perfusion study and no evidence of ischemia.  It was recommended he repeat a nuclear stress test due to intermittent symptoms but he was unable to.  He is requesting to have his testing reordered as he reports it cannot be scheduled with the current order.      Hypertension currently on lisinopril - HCTZ 20/25 daily and Metoprolol daily. Home monitoring with normal readings.  He reports he occasionally misses a dose as his blood pressure is low prior to medication.  He has tried to restrict sodium intake.      Hyperlipidemia was previously on Lipitor 40 mg nightly and Tricor 145 mg daily. Due to side effects he changed to Crestor.  He reports tolerating well and denies side effects.  August 2024 labs with LDL at 51. He continues on fenofibrate.  He denies myalgias with Crestor.       DM, currently on oral medications, managed by PCP.       Palpitations are stable/improved on current dose of medication. He denies worsening palpitations, bradycardia, tachycardia, dizziness or syncope. Recent labs without thyroid disease or anemia    Patient Active Problem List   Diagnosis   • Murmur   • HTN (hypertension)   • HLD (hyperlipidemia)   • Diabetes   • Acid reflux   • Hypertriglyceridemia   • Anxiety   • Current every day nicotine vaping   • Morbid obesity with BMI of 40.0-44.9, adult   • Pancreatitis   • Gout   • Fatigue   • Palpitations   • LV dysfunction   • Multiple risk factors for coronary artery disease     Past Medical History:   Diagnosis Date   • Acid reflux     daily omeprazole controls symptoms well, no past EGD.    • Anxiety    • Current every day nicotine vaping    • Diabetes 2014   • Fatigue    • Gout     last flare approx 2016, no issues since   • HLD (hyperlipidemia)    • HTN (hypertension)    • Hypertriglyceridemia    • Morbid obesity with BMI of 40.0-44.9, adult    • Murmur     childhood   • Pancreatitis 2020    hypertriglyceridemia, no procedures.      Past Surgical History:   Procedure Laterality Date   • COLONOSCOPY     • ENDOSCOPY       Family History   Problem Relation Age of Onset   • Obesity Mother    • Diabetes Mother    • Hypertension Mother    • Sleep apnea Mother    • Bipolar disorder Mother    • Obesity Father    • Hypertension Father    • Skin cancer Father    • Diabetes Maternal Grandmother    • Hypertension Maternal Grandmother    • Cancer Maternal Grandmother         skin     Social History     Tobacco Use   • Smoking status: Former     Passive exposure: Never   • Smokeless tobacco: Never   • Tobacco comments:     vapes   Vaping Use   • Vaping status: Every Day   • Substances: Nicotine, Flavoring   • Devices: Pre-filled or refillable cartridge, Refillable tank    Substance Use Topics   • Alcohol use: Not Currently   • Drug use: Never     The following portions of the patient's history were reviewed and updated as appropriate: allergies, current medications, past family history, past medical history, past social history, past surgical history and problem list.    No Known Allergies      Current Outpatient Medications:   •  fenofibrate (TRICOR) 145 MG tablet, Take 1 tablet by mouth Daily., Disp: 30 tablet, Rfl: 0  •  glimepiride (AMARYL) 4 MG tablet, Take 1 tablet by mouth Daily., Disp: , Rfl:   •  Jardiance 10 MG tablet tablet, 1 tablet., Disp: , Rfl:   •  lisinopril-hydrochlorothiazide (PRINZIDE,ZESTORETIC) 20-25 MG per tablet, Take 1 tablet by mouth Daily. for blood pressure., Disp: 90 tablet, Rfl: 1  •  metFORMIN (GLUCOPHAGE) 1000 MG tablet, Take 1 tablet by mouth 2 (two) times a day., Disp: , Rfl:   •  metoprolol succinate XL (TOPROL-XL) 50 MG 24 hr tablet, Take 1 tablet by mouth Daily., Disp: 90 tablet, Rfl: 1  •  omeprazole (priLOSEC) 40 MG capsule, Take 1 capsule by mouth Daily., Disp: , Rfl:   •  rosuvastatin (CRESTOR) 5 MG tablet, Take 1 tablet by mouth Daily., Disp: 30 tablet, Rfl: 0  •  vitamin D (ERGOCALCIFEROL) 1.25 MG (57297 UT) capsule capsule, TAKE ONE CAPSULE BY MOUTH ONCE EVERY WEEK FOR VITAMIN D DEFICIENCY, Disp: , Rfl:     Review of Systems   Constitutional:  Negative for activity change, appetite change, chills, diaphoresis, fatigue and fever.   HENT:  Negative for congestion, drooling, ear discharge, ear pain, mouth sores, nosebleeds, postnasal drip, rhinorrhea, sinus pressure, sneezing and sore throat.    Eyes:  Negative for pain, discharge and visual disturbance.   Respiratory:  Negative for cough, chest tightness, shortness of breath and wheezing.    Cardiovascular:  Positive for chest pain. Negative for palpitations and leg swelling.   Gastrointestinal:  Negative for abdominal pain, constipation, diarrhea, nausea and vomiting.   Endocrine: Negative  "for cold intolerance, heat intolerance, polydipsia, polyphagia and polyuria.   Musculoskeletal:  Negative for arthralgias, myalgias and neck pain.   Skin:  Negative for rash and wound.   Neurological:  Negative for syncope, speech difficulty, weakness, light-headedness and headaches.   Hematological:  Negative for adenopathy. Does not bruise/bleed easily.   Psychiatric/Behavioral:  Negative for confusion, dysphoric mood and sleep disturbance. The patient is not nervous/anxious.    All other systems reviewed and are negative.    /66 (BP Location: Left arm, Patient Position: Sitting, Cuff Size: Large Adult)   Pulse 71   Ht 188 cm (74\")   Wt (!) 140 kg (309 lb)   SpO2 95%   BMI 39.67 kg/m²     Objective  No Known Allergies    Physical Exam  Vitals reviewed.   Constitutional:       Appearance: Normal appearance. He is well-developed. He is obese.   HENT:      Head: Normocephalic.   Eyes:      Conjunctiva/sclera: Conjunctivae normal.   Neck:      Thyroid: No thyromegaly.      Vascular: No carotid bruit or JVD.   Cardiovascular:      Rate and Rhythm: Normal rate and regular rhythm.   Pulmonary:      Effort: Pulmonary effort is normal.      Breath sounds: Normal breath sounds.   Abdominal:      General: Bowel sounds are normal.      Palpations: Abdomen is soft. There is no hepatomegaly, splenomegaly or mass.      Tenderness: There is no abdominal tenderness.   Musculoskeletal:      Cervical back: Neck supple.      Right lower leg: No edema.      Left lower leg: No edema.   Skin:     General: Skin is warm and dry.   Neurological:      Mental Status: He is alert and oriented to person, place, and time.   Psychiatric:         Attention and Perception: Attention normal.         Mood and Affect: Mood normal.         Speech: Speech normal.         Behavior: Behavior normal. Behavior is cooperative.         Cognition and Memory: Cognition normal.           LABS  WBC   Date Value Ref Range Status   07/23/2021 6.1 3.4 - " 10.8 x10E3/uL Final     RBC   Date Value Ref Range Status   07/23/2021 5.80 4.14 - 5.80 x10E6/uL Final     Hemoglobin   Date Value Ref Range Status   07/23/2021 16.8 13.0 - 17.7 g/dL Final     Hematocrit   Date Value Ref Range Status   07/23/2021 48.4 37.5 - 51.0 % Final     MCV   Date Value Ref Range Status   07/23/2021 83 79 - 97 fL Final     MCH   Date Value Ref Range Status   07/23/2021 29.0 26.6 - 33.0 pg Final     MCHC   Date Value Ref Range Status   07/23/2021 34.7 31.5 - 35.7 g/dL Final     RDW   Date Value Ref Range Status   07/23/2021 12.9 11.6 - 15.4 % Final     Platelets   Date Value Ref Range Status   07/23/2021 262 150 - 450 x10E3/uL Final     Neutrophil Rel %   Date Value Ref Range Status   07/23/2021 46 Not Estab. % Final     Lymphocyte Rel %   Date Value Ref Range Status   07/23/2021 44 Not Estab. % Final     Monocyte Rel %   Date Value Ref Range Status   07/23/2021 6 Not Estab. % Final     Eosinophil Rel %   Date Value Ref Range Status   07/23/2021 2 Not Estab. % Final     Basophil Rel %   Date Value Ref Range Status   07/23/2021 2 Not Estab. % Final     Neutrophils Absolute   Date Value Ref Range Status   07/23/2021 2.8 1.4 - 7.0 x10E3/uL Final     Lymphocytes Absolute   Date Value Ref Range Status   07/23/2021 2.7 0.7 - 3.1 x10E3/uL Final     Monocytes Absolute   Date Value Ref Range Status   07/23/2021 0.4 0.1 - 0.9 x10E3/uL Final     Eosinophils Absolute   Date Value Ref Range Status   07/23/2021 0.1 0.0 - 0.4 x10E3/uL Final     Basophils Absolute   Date Value Ref Range Status   07/23/2021 0.1 0.0 - 0.2 x10E3/uL Final       Triglycerides   Date Value Ref Range Status   07/23/2021 413 (H) 0 - 149 mg/dL Final     HDL Cholesterol   Date Value Ref Range Status   07/23/2021 36 (L) >39 mg/dL Final     LDL Chol Calc (NIH)   Date Value Ref Range Status   07/23/2021 46 0 - 99 mg/dL Final     IMAGING  XR chest AP portable    Result Date: 8/6/2024  No acute cardiopulmonary process. Images reviewed,  interpreted, and dictated by KIMBERLY Mcallister M.D.             Assessment & Plan  Diagnoses and all orders for this visit:    1. Chest pain, unspecified type (Primary)  -     Stress Test With Myocardial Perfusion (1 Day); Future  Reorder nuclear stress test    2. LV dysfunction  Clinically asymptomatic, will continue to monitor  Continue lisinopril and beta-blocker    3. Palpitations  Well-controlled on beta-blocker, will plan to continue  Recommend avoidance of caffeine/stimulants    4. Hypertension, unspecified type  Well-controlled, continue current therapy  Routine monitoring recommended  Sodium restrictions recommended    5. Hyperlipidemia LDL goal <70  Continue on statin  Heart healthy diet  LDL goal less than 70    6. Multiple risk factors for coronary artery disease  Ischemic evaluation pending  Aggressive risk factor modification   further workup as warranted    Other orders  -     lisinopril-hydrochlorothiazide (PRINZIDE,ZESTORETIC) 20-25 MG per tablet; Take 1 tablet by mouth Daily. for blood pressure.  Dispense: 90 tablet; Refill: 1  -     metoprolol succinate XL (TOPROL-XL) 50 MG 24 hr tablet; Take 1 tablet by mouth Daily.  Dispense: 90 tablet; Refill: 1          Review of medical record    Lifestyle modifications including heart healthy diet, regular exercise, maintenance of desirable body weight and avoidance of tobacco products    Follow-up in 3 months with labs, sooner if needed.

## 2024-11-21 NOTE — PROGRESS NOTES
Subjective     Cezar Burden is a 34 y.o. male.   Chief Complaint   Patient presents with    Med Refill     PENDING  NEED TO REORDER STRESS TEST      History of Present Illness   Cezar Burden is a 34-year-old male who presents to clinic today for cardiology follow-up.  He feels he is overall doing well and denies any acute complaint.    LV dysfunction 46 to 50% from echocardiogram 8/30/2021.  Echocardiogram with hypokinesis noted of the septum and borderline low LVEF at 51 to 55% from echocardiogram on 5/10/2023.  He denies dyspnea, orthopnea, or swelling.  He was started on Coreg but changed to metoprolol due to soft BP.  He reports tolerating medication well. He reports experiencing occasional chest discomfort.  His last nuclear stress test was in 2023 with a normal myocardial perfusion study and no evidence of ischemia.  It was recommended he repeat a nuclear stress test due to intermittent symptoms but he was unable to.  He is requesting to have his testing reordered as he reports it cannot be scheduled with the current order.      Hypertension currently on lisinopril - HCTZ 20/25 daily and Metoprolol daily. Home monitoring with normal readings.  He reports he occasionally misses a dose as his blood pressure is low prior to medication.  He has tried to restrict sodium intake.      Hyperlipidemia was previously on Lipitor 40 mg nightly and Tricor 145 mg daily. Due to side effects he changed to Crestor.  He reports tolerating well and denies side effects. August 2024 labs with LDL at 51. He continues on fenofibrate.  He denies myalgias with Crestor.       DM, currently on oral medications, managed by PCP.       Palpitations are stable/improved on current dose of medication. He denies worsening palpitations, bradycardia, tachycardia, dizziness or syncope. Recent labs without thyroid disease or anemia    Patient Active Problem List   Diagnosis    Murmur    HTN (hypertension)    HLD (hyperlipidemia)    Diabetes     Acid reflux    Hypertriglyceridemia    Anxiety    Current every day nicotine vaping    Morbid obesity with BMI of 40.0-44.9, adult    Pancreatitis    Gout    Fatigue    Palpitations    LV dysfunction    Multiple risk factors for coronary artery disease     Past Medical History:   Diagnosis Date    Acid reflux     daily omeprazole controls symptoms well, no past EGD.     Anxiety     Current every day nicotine vaping     Diabetes 2014    Fatigue     Gout     last flare approx 2016, no issues since    HLD (hyperlipidemia)     HTN (hypertension)     Hypertriglyceridemia     Morbid obesity with BMI of 40.0-44.9, adult     Murmur     childhood    Pancreatitis 2020    hypertriglyceridemia, no procedures.      Past Surgical History:   Procedure Laterality Date    COLONOSCOPY      ENDOSCOPY       Family History   Problem Relation Age of Onset    Obesity Mother     Diabetes Mother     Hypertension Mother     Sleep apnea Mother     Bipolar disorder Mother     Obesity Father     Hypertension Father     Skin cancer Father     Diabetes Maternal Grandmother     Hypertension Maternal Grandmother     Cancer Maternal Grandmother         skin     Social History     Tobacco Use    Smoking status: Former     Passive exposure: Never    Smokeless tobacco: Never    Tobacco comments:     vapes   Vaping Use    Vaping status: Every Day    Substances: Nicotine, Flavoring    Devices: Pre-filled or refillable cartridge, Refillable tank   Substance Use Topics    Alcohol use: Not Currently    Drug use: Never     The following portions of the patient's history were reviewed and updated as appropriate: allergies, current medications, past family history, past medical history, past social history, past surgical history and problem list.    No Known Allergies      Current Outpatient Medications:     fenofibrate (TRICOR) 145 MG tablet, Take 1 tablet by mouth Daily., Disp: 30 tablet, Rfl: 0    glimepiride (AMARYL) 4 MG tablet, Take 1 tablet by mouth  Daily., Disp: , Rfl:     Jardiance 10 MG tablet tablet, 1 tablet., Disp: , Rfl:     lisinopril-hydrochlorothiazide (PRINZIDE,ZESTORETIC) 20-25 MG per tablet, Take 1 tablet by mouth Daily. for blood pressure., Disp: 90 tablet, Rfl: 1    metFORMIN (GLUCOPHAGE) 1000 MG tablet, Take 1 tablet by mouth 2 (two) times a day., Disp: , Rfl:     metoprolol succinate XL (TOPROL-XL) 50 MG 24 hr tablet, Take 1 tablet by mouth Daily., Disp: 90 tablet, Rfl: 1    omeprazole (priLOSEC) 40 MG capsule, Take 1 capsule by mouth Daily., Disp: , Rfl:     rosuvastatin (CRESTOR) 5 MG tablet, Take 1 tablet by mouth Daily., Disp: 30 tablet, Rfl: 0    vitamin D (ERGOCALCIFEROL) 1.25 MG (18172 UT) capsule capsule, TAKE ONE CAPSULE BY MOUTH ONCE EVERY WEEK FOR VITAMIN D DEFICIENCY, Disp: , Rfl:     Review of Systems   Constitutional:  Negative for activity change, appetite change, chills, diaphoresis, fatigue and fever.   HENT:  Negative for congestion, drooling, ear discharge, ear pain, mouth sores, nosebleeds, postnasal drip, rhinorrhea, sinus pressure, sneezing and sore throat.    Eyes:  Negative for pain, discharge and visual disturbance.   Respiratory:  Negative for cough, chest tightness, shortness of breath and wheezing.    Cardiovascular:  Positive for chest pain. Negative for palpitations and leg swelling.   Gastrointestinal:  Negative for abdominal pain, constipation, diarrhea, nausea and vomiting.   Endocrine: Negative for cold intolerance, heat intolerance, polydipsia, polyphagia and polyuria.   Musculoskeletal:  Negative for arthralgias, myalgias and neck pain.   Skin:  Negative for rash and wound.   Neurological:  Negative for syncope, speech difficulty, weakness, light-headedness and headaches.   Hematological:  Negative for adenopathy. Does not bruise/bleed easily.   Psychiatric/Behavioral:  Negative for confusion, dysphoric mood and sleep disturbance. The patient is not nervous/anxious.    All other systems reviewed and are  "negative.    /66 (BP Location: Left arm, Patient Position: Sitting, Cuff Size: Large Adult)   Pulse 71   Ht 188 cm (74\")   Wt (!) 140 kg (309 lb)   SpO2 95%   BMI 39.67 kg/m²     Objective   No Known Allergies    Physical Exam  Vitals reviewed.   Constitutional:       Appearance: Normal appearance. He is well-developed. He is obese.   HENT:      Head: Normocephalic.   Eyes:      Conjunctiva/sclera: Conjunctivae normal.   Neck:      Thyroid: No thyromegaly.      Vascular: No carotid bruit or JVD.   Cardiovascular:      Rate and Rhythm: Normal rate and regular rhythm.   Pulmonary:      Effort: Pulmonary effort is normal.      Breath sounds: Normal breath sounds.   Abdominal:      General: Bowel sounds are normal.      Palpations: Abdomen is soft. There is no hepatomegaly, splenomegaly or mass.      Tenderness: There is no abdominal tenderness.   Musculoskeletal:      Cervical back: Neck supple.      Right lower leg: No edema.      Left lower leg: No edema.   Skin:     General: Skin is warm and dry.   Neurological:      Mental Status: He is alert and oriented to person, place, and time.   Psychiatric:         Attention and Perception: Attention normal.         Mood and Affect: Mood normal.         Speech: Speech normal.         Behavior: Behavior normal. Behavior is cooperative.         Cognition and Memory: Cognition normal.           LABS  WBC   Date Value Ref Range Status   07/23/2021 6.1 3.4 - 10.8 x10E3/uL Final     RBC   Date Value Ref Range Status   07/23/2021 5.80 4.14 - 5.80 x10E6/uL Final     Hemoglobin   Date Value Ref Range Status   07/23/2021 16.8 13.0 - 17.7 g/dL Final     Hematocrit   Date Value Ref Range Status   07/23/2021 48.4 37.5 - 51.0 % Final     MCV   Date Value Ref Range Status   07/23/2021 83 79 - 97 fL Final     MCH   Date Value Ref Range Status   07/23/2021 29.0 26.6 - 33.0 pg Final     MCHC   Date Value Ref Range Status   07/23/2021 34.7 31.5 - 35.7 g/dL Final     RDW   Date Value " Ref Range Status   07/23/2021 12.9 11.6 - 15.4 % Final     Platelets   Date Value Ref Range Status   07/23/2021 262 150 - 450 x10E3/uL Final     Neutrophil Rel %   Date Value Ref Range Status   07/23/2021 46 Not Estab. % Final     Lymphocyte Rel %   Date Value Ref Range Status   07/23/2021 44 Not Estab. % Final     Monocyte Rel %   Date Value Ref Range Status   07/23/2021 6 Not Estab. % Final     Eosinophil Rel %   Date Value Ref Range Status   07/23/2021 2 Not Estab. % Final     Basophil Rel %   Date Value Ref Range Status   07/23/2021 2 Not Estab. % Final     Neutrophils Absolute   Date Value Ref Range Status   07/23/2021 2.8 1.4 - 7.0 x10E3/uL Final     Lymphocytes Absolute   Date Value Ref Range Status   07/23/2021 2.7 0.7 - 3.1 x10E3/uL Final     Monocytes Absolute   Date Value Ref Range Status   07/23/2021 0.4 0.1 - 0.9 x10E3/uL Final     Eosinophils Absolute   Date Value Ref Range Status   07/23/2021 0.1 0.0 - 0.4 x10E3/uL Final     Basophils Absolute   Date Value Ref Range Status   07/23/2021 0.1 0.0 - 0.2 x10E3/uL Final       Triglycerides   Date Value Ref Range Status   07/23/2021 413 (H) 0 - 149 mg/dL Final     HDL Cholesterol   Date Value Ref Range Status   07/23/2021 36 (L) >39 mg/dL Final     LDL Chol Calc (NIH)   Date Value Ref Range Status   07/23/2021 46 0 - 99 mg/dL Final     IMAGING  XR chest AP portable    Result Date: 8/6/2024  No acute cardiopulmonary process. Images reviewed, interpreted, and dictated by KIMBERLY Mcallister M.D.             Assessment & Plan   Diagnoses and all orders for this visit:    1. Chest pain, unspecified type (Primary)  -     Stress Test With Myocardial Perfusion (1 Day); Future  Reorder nuclear stress test    2. LV dysfunction  Clinically asymptomatic, will continue to monitor  Continue lisinopril and beta-blocker    3. Palpitations  Well-controlled on beta-blocker, will plan to continue  Recommend avoidance of caffeine/stimulants    4. Hypertension, unspecified  type  Well-controlled, continue current therapy  Routine monitoring recommended  Sodium restrictions recommended    5. Hyperlipidemia LDL goal <70  Continue on statin  Heart healthy diet  LDL goal less than 70    6. Multiple risk factors for coronary artery disease  Ischemic evaluation pending  Aggressive risk factor modification   further workup as warranted    Other orders  -     lisinopril-hydrochlorothiazide (PRINZIDE,ZESTORETIC) 20-25 MG per tablet; Take 1 tablet by mouth Daily. for blood pressure.  Dispense: 90 tablet; Refill: 1  -     metoprolol succinate XL (TOPROL-XL) 50 MG 24 hr tablet; Take 1 tablet by mouth Daily.  Dispense: 90 tablet; Refill: 1          Review of medical record    Lifestyle modifications including heart healthy diet, regular exercise, maintenance of desirable body weight and avoidance of tobacco products    Follow-up in 3 months with labs, sooner if needed.

## 2025-01-13 ENCOUNTER — HOSPITAL ENCOUNTER (OUTPATIENT)
Dept: NUCLEAR MEDICINE | Facility: HOSPITAL | Age: 35
End: 2025-01-13
Payer: MEDICAID

## 2025-01-13 ENCOUNTER — HOSPITAL ENCOUNTER (OUTPATIENT)
Dept: CARDIOLOGY | Facility: HOSPITAL | Age: 35
End: 2025-01-13
Payer: MEDICAID

## 2025-01-13 ENCOUNTER — HOSPITAL ENCOUNTER (OUTPATIENT)
Dept: NUCLEAR MEDICINE | Facility: HOSPITAL | Age: 35
Discharge: HOME | End: 2025-01-13
Payer: MEDICAID

## 2025-01-13 DIAGNOSIS — R07.9 CHEST PAIN, UNSPECIFIED TYPE: ICD-10-CM

## 2025-01-15 ENCOUNTER — HOSPITAL ENCOUNTER (OUTPATIENT)
Dept: NUCLEAR MEDICINE | Facility: HOSPITAL | Age: 35
Discharge: HOME | End: 2025-01-15
Payer: MEDICAID

## 2025-01-15 ENCOUNTER — HOSPITAL ENCOUNTER (OUTPATIENT)
Dept: CARDIOLOGY | Facility: HOSPITAL | Age: 35
Discharge: HOME | End: 2025-01-15
Payer: MEDICAID

## 2025-01-15 PROCEDURE — A9500 TC99M SESTAMIBI: HCPCS | Performed by: NURSE PRACTITIONER

## 2025-01-15 PROCEDURE — 34310000005 TECHNETIUM SESTAMIBI: Performed by: NURSE PRACTITIONER

## 2025-01-15 PROCEDURE — 78452 HT MUSCLE IMAGE SPECT MULT: CPT

## 2025-01-15 PROCEDURE — 93017 CV STRESS TEST TRACING ONLY: CPT

## 2025-01-15 RX ADMIN — TECHNETIUM TC 99M SESTAMIBI 1 DOSE: 1 INJECTION INTRAVENOUS at 09:02

## 2025-01-15 RX ADMIN — TECHNETIUM TC 99M SESTAMIBI 1 DOSE: 1 INJECTION INTRAVENOUS at 10:15

## 2025-01-16 LAB
BH CV NUCLEAR PRIOR STUDY: 3
BH CV REST NUCLEAR ISOTOPE DOSE: 9.5 MCI
BH CV STRESS BP STAGE 1: NORMAL
BH CV STRESS BP STAGE 2: NORMAL
BH CV STRESS DURATION MIN STAGE 1: 3
BH CV STRESS DURATION MIN STAGE 2: 3
BH CV STRESS DURATION MIN STAGE 3: 1
BH CV STRESS DURATION SEC STAGE 1: 0
BH CV STRESS DURATION SEC STAGE 2: 0
BH CV STRESS DURATION SEC STAGE 3: 44
BH CV STRESS GRADE STAGE 1: 10
BH CV STRESS GRADE STAGE 2: 12
BH CV STRESS GRADE STAGE 3: 14
BH CV STRESS HR STAGE 1: 109
BH CV STRESS HR STAGE 2: 122
BH CV STRESS HR STAGE 3: 160
BH CV STRESS METS STAGE 1: 5
BH CV STRESS METS STAGE 2: 7.5
BH CV STRESS METS STAGE 3: 10
BH CV STRESS NUCLEAR ISOTOPE DOSE: 29.6 MCI
BH CV STRESS PROTOCOL 1: NORMAL
BH CV STRESS RECOVERY BP: NORMAL MMHG
BH CV STRESS RECOVERY HR: 105 BPM
BH CV STRESS SPEED STAGE 1: 1.7
BH CV STRESS SPEED STAGE 2: 2.5
BH CV STRESS SPEED STAGE 3: 3.4
BH CV STRESS STAGE 1: 1
BH CV STRESS STAGE 2: 2
BH CV STRESS STAGE 3: 3
MAXIMAL PREDICTED HEART RATE: 186 BPM
PERCENT MAX PREDICTED HR: 86.02 %
SPECT HRT GATED+EF W RNC IV: 73 %
STRESS BASELINE BP: NORMAL MMHG
STRESS BASELINE HR: 83 BPM
STRESS PERCENT HR: 101 %
STRESS POST ESTIMATED WORKLOAD: 10.1 METS
STRESS POST EXERCISE DUR MIN: 7 MIN
STRESS POST EXERCISE DUR SEC: 44 SEC
STRESS POST PEAK BP: NORMAL MMHG
STRESS POST PEAK HR: 160 BPM
STRESS TARGET HR: 158 BPM

## 2025-01-20 ENCOUNTER — TELEPHONE (OUTPATIENT)
Dept: CARDIOLOGY | Facility: CLINIC | Age: 35
End: 2025-01-20
Payer: MEDICAID

## 2025-01-20 NOTE — TELEPHONE ENCOUNTER
Spoke with ptJaison message regarding stress test results    Pt v/u    ----- Message from Irma Marcelino sent at 1/20/2025 11:41 AM EST -----  Nuclear stress test with a normal myocardial perfusion study with no evidence of ischemia    Continue current plan of care, keep follow up as scheduled